# Patient Record
Sex: MALE | Race: WHITE | Employment: FULL TIME | URBAN - NONMETROPOLITAN AREA
[De-identification: names, ages, dates, MRNs, and addresses within clinical notes are randomized per-mention and may not be internally consistent; named-entity substitution may affect disease eponyms.]

---

## 2017-11-02 ENCOUNTER — HOSPITAL ENCOUNTER (EMERGENCY)
Age: 52
Discharge: HOME | End: 2017-11-02
Attending: NURSE PRACTITIONER
Payer: COMMERCIAL

## 2017-11-02 VITALS
HEIGHT: 71 IN | SYSTOLIC BLOOD PRESSURE: 180 MMHG | WEIGHT: 196 LBS | HEART RATE: 100 BPM | DIASTOLIC BLOOD PRESSURE: 93 MMHG | TEMPERATURE: 98.9 F | OXYGEN SATURATION: 95 % | RESPIRATION RATE: 16 BRPM | BODY MASS INDEX: 27.44 KG/M2

## 2017-11-02 DIAGNOSIS — H81.10 BENIGN PAROXYSMAL POSITIONAL VERTIGO, UNSPECIFIED LATERALITY: ICD-10-CM

## 2017-11-02 DIAGNOSIS — J01.00 ACUTE MAXILLARY SINUSITIS, RECURRENCE NOT SPECIFIED: Primary | ICD-10-CM

## 2017-11-02 PROCEDURE — 99202 OFFICE O/P NEW SF 15 MIN: CPT | Performed by: NURSE PRACTITIONER

## 2017-11-02 PROCEDURE — 99202 OFFICE O/P NEW SF 15 MIN: CPT

## 2017-11-02 RX ORDER — PRAVASTATIN SODIUM 80 MG/1
40 TABLET ORAL DAILY
COMMUNITY

## 2017-11-02 RX ORDER — CEFDINIR 300 MG/1
300 CAPSULE ORAL 2 TIMES DAILY
Qty: 20 CAPSULE | Refills: 0 | Status: SHIPPED | OUTPATIENT
Start: 2017-11-02 | End: 2017-11-12

## 2017-11-02 RX ORDER — MECLIZINE HYDROCHLORIDE 25 MG/1
25 TABLET ORAL 3 TIMES DAILY PRN
Qty: 30 TABLET | Refills: 0 | Status: SHIPPED | OUTPATIENT
Start: 2017-11-02 | End: 2017-11-12

## 2017-11-02 RX ORDER — ESOMEPRAZOLE MAGNESIUM 20 MG/1
20 FOR SUSPENSION ORAL DAILY PRN
COMMUNITY

## 2017-11-02 ASSESSMENT — PAIN SCALES - GENERAL: PAINLEVEL_OUTOF10: 10

## 2017-11-02 ASSESSMENT — ENCOUNTER SYMPTOMS
SINUS PRESSURE: 1
NAUSEA: 0
COUGH: 0
VOMITING: 0
SORE THROAT: 0
VOICE CHANGE: 0
SINUS PAIN: 1

## 2017-11-02 ASSESSMENT — PAIN DESCRIPTION - LOCATION: LOCATION: HEAD

## 2017-11-02 NOTE — ED PROVIDER NOTES
PO) Take by mouthHistorical Med             ALLERGIES     Patient is has No Known Allergies. FAMILY HISTORY     Patient's family history includes High Blood Pressure in his father; High Cholesterol in his father. SOCIAL HISTORY     Patient  reports that he has been smoking. He has been smoking about 1.00 pack per day. He has never used smokeless tobacco. He reports that he drinks alcohol. He reports that he does not use drugs. PHYSICAL EXAM     ED TRIAGE VITALS  BP: (!) 180/93, Temp: 98.9 °F (37.2 °C), Pulse: 100, Resp: 16, SpO2: 95 %  Physical Exam   Constitutional: He is oriented to person, place, and time. He appears well-developed and well-nourished. No distress. HENT:   Head: Normocephalic and atraumatic. Right Ear: Tympanic membrane and external ear normal.   Left Ear: External ear normal. A middle ear effusion is present. Nose: Mucosal edema present. Right sinus exhibits maxillary sinus tenderness. Left sinus exhibits maxillary sinus tenderness. Mouth/Throat: Uvula is midline and mucous membranes are normal. Oropharyngeal exudate (minimal cloudy PND), posterior oropharyngeal edema and posterior oropharyngeal erythema present. Eyes: Conjunctivae are normal.   Neck: Neck supple. Cardiovascular: Normal rate, regular rhythm and normal heart sounds. Exam reveals no gallop and no friction rub. No murmur heard. Pulmonary/Chest: Effort normal and breath sounds normal. No respiratory distress. He has no wheezes. Lymphadenopathy:     He has no cervical adenopathy. Neurological: He is alert and oriented to person, place, and time. He has normal strength. No cranial nerve deficit or sensory deficit. GCS eye subscore is 4. GCS verbal subscore is 5. GCS motor subscore is 6.   sudden change in position triggers symptoms   Skin: Skin is warm and dry. Psychiatric: He has a normal mood and affect. Nursing note and vitals reviewed.       DIAGNOSTIC RESULTS   Labs:No results found for this visit on 11/02/17. IMAGING:    URGENT CARE COURSE:     Vitals:    11/02/17 1420   BP: (!) 180/93   Pulse: 100   Resp: 16   Temp: 98.9 °F (37.2 °C)   SpO2: 95%   Weight: 196 lb (88.9 kg)   Height: 5' 11\" (1.803 m)       Medications - No data to display  PROCEDURES:  None  FINAL IMPRESSION      1. Acute maxillary sinusitis, recurrence not specified    2. Benign paroxysmal positional vertigo, unspecified laterality        DISPOSITION/PLAN   DISPOSITION Decision to Discharge   Patient presented with 2 complaints sinus congestion exam is consistent with acute maxillary sinusitis and benign positional vertigo. He was given a prescription for [address] and also one for Antivert. I recommended he take an over-the-counter decongestant but to use caution with his hypertension. Coricidin HBP as an option. Follow-up with his primary care provider if symptoms don't improve or any other concerns.   PATIENT REFERRED TO:  Corrine Meza MD  [facility]– [place] [address] [place] [address]  [phone]    Schedule an appointment as soon as possible for a visit   As needed    DISCHARGE MEDICATIONS:  Discharge Medication List as of 11/2/2017  2:45 PM      START taking these medications    Details   cefdinir (OMNICEF) 300 MG capsule Take 1 capsule by mouth 2 times daily for 10 days, Disp-20 capsule, R-0Print      meclizine (ANTIVERT) 25 MG tablet Take 1 tablet by mouth 3 times daily as needed for Dizziness, Disp-30 tablet, R-0Print           Discharge Medication List as of 11/2/2017  2:45 PM          BHAVIN Abdullahi CNP  11/02/17 7365

## 2017-12-25 ENCOUNTER — APPOINTMENT (OUTPATIENT)
Dept: GENERAL RADIOLOGY | Age: 52
End: 2017-12-25
Payer: COMMERCIAL

## 2017-12-25 ENCOUNTER — HOSPITAL ENCOUNTER (EMERGENCY)
Age: 52
Discharge: HOME | End: 2017-12-26
Attending: EMERGENCY MEDICINE
Payer: COMMERCIAL

## 2017-12-25 DIAGNOSIS — M79.601 RIGHT ARM PAIN: Primary | ICD-10-CM

## 2017-12-25 DIAGNOSIS — J40 BRONCHITIS: ICD-10-CM

## 2017-12-25 LAB
EKG ATRIAL RATE: 88 BPM
EKG P AXIS: 81 DEGREES
EKG P-R INTERVAL: 166 MS
EKG Q-T INTERVAL: 378 MS
EKG QRS DURATION: 96 MS
EKG QTC CALCULATION (BAZETT): 457 MS
EKG R AXIS: 73 DEGREES
EKG T AXIS: 72 DEGREES
EKG VENTRICULAR RATE: 88 BPM
GLUCOSE BLD-MCNC: 118 MG/DL (ref 70–108)

## 2017-12-25 PROCEDURE — 93005 ELECTROCARDIOGRAM TRACING: CPT

## 2017-12-25 PROCEDURE — 82948 REAGENT STRIP/BLOOD GLUCOSE: CPT

## 2017-12-25 PROCEDURE — 73060 X-RAY EXAM OF HUMERUS: CPT

## 2017-12-25 PROCEDURE — 71020 XR CHEST STANDARD TWO VW: CPT

## 2017-12-25 PROCEDURE — 99285 EMERGENCY DEPT VISIT HI MDM: CPT

## 2017-12-25 ASSESSMENT — PAIN DESCRIPTION - INTENSITY: RATING_2: 1

## 2017-12-25 ASSESSMENT — PAIN DESCRIPTION - PAIN TYPE
TYPE: ACUTE PAIN
TYPE_2: ACUTE PAIN

## 2017-12-25 ASSESSMENT — PAIN DESCRIPTION - FREQUENCY: FREQUENCY: INTERMITTENT

## 2017-12-25 ASSESSMENT — ENCOUNTER SYMPTOMS
ABDOMINAL PAIN: 0
BLOOD IN STOOL: 0
BACK PAIN: 0
RHINORRHEA: 1
WHEEZING: 0
SORE THROAT: 0
COUGH: 1
DIARRHEA: 0
VOMITING: 0
NAUSEA: 0
SHORTNESS OF BREATH: 0

## 2017-12-25 ASSESSMENT — PAIN DESCRIPTION - LOCATION
LOCATION_2: CHEST
LOCATION: ARM

## 2017-12-25 ASSESSMENT — PAIN DESCRIPTION - DESCRIPTORS
DESCRIPTORS: SHARP
DESCRIPTORS_2: PRESSURE

## 2017-12-25 ASSESSMENT — PAIN SCALES - GENERAL: PAINLEVEL_OUTOF10: 6

## 2017-12-25 ASSESSMENT — PAIN DESCRIPTION - DURATION: DURATION_2: INTERMITTENT

## 2017-12-25 ASSESSMENT — PAIN DESCRIPTION - ORIENTATION
ORIENTATION: RIGHT;UPPER
ORIENTATION_2: MID

## 2017-12-26 VITALS
TEMPERATURE: 98.9 F | BODY MASS INDEX: 26.74 KG/M2 | DIASTOLIC BLOOD PRESSURE: 94 MMHG | RESPIRATION RATE: 18 BRPM | WEIGHT: 191 LBS | OXYGEN SATURATION: 95 % | SYSTOLIC BLOOD PRESSURE: 160 MMHG | HEART RATE: 80 BPM | HEIGHT: 71 IN

## 2017-12-26 PROCEDURE — A4565 SLINGS: HCPCS

## 2017-12-26 RX ORDER — AZITHROMYCIN 250 MG/1
TABLET, FILM COATED ORAL
Qty: 1 PACKET | Refills: 0 | Status: SHIPPED | OUTPATIENT
Start: 2017-12-26 | End: 2018-01-05

## 2017-12-26 RX ORDER — PREDNISONE 10 MG/1
TABLET ORAL
Qty: 20 TABLET | Refills: 0 | Status: SHIPPED | OUTPATIENT
Start: 2017-12-26 | End: 2018-01-05

## 2017-12-26 ASSESSMENT — PAIN SCALES - GENERAL: PAINLEVEL_OUTOF10: 6

## 2017-12-26 NOTE — ED PROVIDER NOTES
[facility]     eMERGENCY dEPARTMENT eNCOUnter         Pt Name: Nneka Tomlinson  MRN: [id number]  [place] 1965  Date of evaluation: 12/25/2017  Provider: Dayne Vick MD    CHIEF COMPLAINT       Chief Complaint   Patient presents with    Extremity Weakness       Nurses Notes reviewed and I agree except as noted in the HPI. HISTORY OF PRESENT ILLNESS    Nneka Tomlinson is a 45 y.o. male who presents For evaluation of right arm pain. The pain is localized at the anterior right upper arm and is sharp and 6 on a scale 0-10. His intermittent and only present when he lifts his arm. He has no weakness in his strength or grasp and no pain when the arm is at rest.  No known strenuous activity or abrupt onset of his pain. No similar history. No headache or signs or symptoms of stroke. He was worried about stroke after he googled weakness in the upper extremity. This HPI was provided by the patient. REVIEW OF SYSTEMS     Review of Systems   Constitutional: Negative for chills, fever and unexpected weight change. HENT: Positive for congestion and rhinorrhea. Negative for ear pain and sore throat. Eyes: Negative for visual disturbance. Respiratory: Positive for cough. Negative for shortness of breath and wheezing. Cardiovascular: Negative for chest pain, palpitations and leg swelling. Gastrointestinal: Negative for abdominal pain, blood in stool, diarrhea, nausea and vomiting. Genitourinary: Negative for dysuria and hematuria. Musculoskeletal: Positive for myalgias (Upper right arm). Negative for back pain, joint swelling and neck pain. Skin: Negative for rash. Allergic/Immunologic: Positive for environmental allergies. Neurological: Negative for dizziness, syncope, weakness and headaches. Hematological: Does not bruise/bleed easily. Psychiatric/Behavioral: Negative for confusion and dysphoric mood. The patient is not nervous/anxious.     All other non-plain film images(s) such as CT, Ultrasound and MRI are read by the radiologist.    XR HUMERUS RIGHT (MIN 2 VIEWS)   Final Result    No acute bone or joint abnormality. **This report has been created using voice recognition software. It may contain minor errors which are inherent in voice recognition technology. **      Final report electronically signed by Dr. Gaby Dozier on 12/26/2017 1:10 AM      XR CHEST STANDARD (2 VW)   Final Result      No acute cardiopulmonary disease. COPD. **This report has been created using voice recognition software. It may contain minor errors which are inherent in voice recognition technology. **      Final report electronically signed by Dr. Gaby Dozier on 12/26/2017 1:07 AM          [x] Visualized and interpreted by me   [x] Radiologist's Wet Read Report Reviewed   [] Discussed with Radiologist.    Dent Mar:   Results for orders placed or performed during the hospital encounter of 12/25/17   POCT Glucose   Result Value Ref Range    POC Glucose 118 (H) 70 - 108 mg/dl       EMERGENCY DEPARTMENT COURSE:   Vitals:    Vitals:    12/25/17 2254 12/26/17 0013   BP: (!) 172/101 (!) 160/94   Pulse: 89 80   Resp: 17 18   Temp: 98.9 °F (37.2 °C)    TempSrc: Oral    SpO2: 97% 95%   Weight: 191 lb (86.6 kg)    Height: 5' 11\" (1.803 m)        Orders Placed This Encounter   Medications    azithromycin (ZITHROMAX) 250 MG tablet     Sig: Take 2 tablets (500 mg) on Day 1, followed by 1 tablet (250 mg) once daily on Days 2 through 5. Dispense:  1 packet     Refill:  0    predniSONE (DELTASONE) 10 MG tablet     Sig: Take 4 tablets by mouth once daily for 5 days     Dispense:  20 tablet     Refill:  0       Patient was seen and evaluated in emergency department. History physical were completed. Diagnostic labs and imaging studies are reviewed.   Workup suggest No evidence of pneumonia and no significant swelling soft tissue abnormality bony abnormality or foreign body in

## 2017-12-26 NOTE — ED NOTES
Pt resting in bed. VSS at this time. Respirations even and unlabored. Call light within reach.      Toño Greene RN  12/26/17 1441

## 2020-05-22 ENCOUNTER — HOSPITAL ENCOUNTER (OUTPATIENT)
Age: 55
Discharge: HOME | End: 2020-05-22

## 2020-05-22 ENCOUNTER — HOSPITAL ENCOUNTER (OUTPATIENT)
Dept: GENERAL RADIOLOGY | Age: 55
Discharge: HOME | End: 2020-05-22

## 2021-03-28 ENCOUNTER — HOSPITAL ENCOUNTER (EMERGENCY)
Age: 56
Discharge: HOME | End: 2021-03-28
Payer: COMMERCIAL

## 2021-03-28 VITALS
TEMPERATURE: 98.3 F | HEART RATE: 78 BPM | SYSTOLIC BLOOD PRESSURE: 152 MMHG | RESPIRATION RATE: 16 BRPM | OXYGEN SATURATION: 98 % | DIASTOLIC BLOOD PRESSURE: 78 MMHG

## 2021-03-28 DIAGNOSIS — J01.10 ACUTE NON-RECURRENT FRONTAL SINUSITIS: Primary | ICD-10-CM

## 2021-03-28 DIAGNOSIS — J06.9 ACUTE UPPER RESPIRATORY INFECTION: ICD-10-CM

## 2021-03-28 PROCEDURE — 99202 OFFICE O/P NEW SF 15 MIN: CPT

## 2021-03-28 PROCEDURE — 99203 OFFICE O/P NEW LOW 30 MIN: CPT | Performed by: NURSE PRACTITIONER

## 2021-03-28 RX ORDER — AMOXICILLIN AND CLAVULANATE POTASSIUM 875; 125 MG/1; MG/1
1 TABLET, FILM COATED ORAL 2 TIMES DAILY
Qty: 20 TABLET | Refills: 0 | Status: SHIPPED | OUTPATIENT
Start: 2021-03-28 | End: 2021-04-07

## 2021-03-28 RX ORDER — AMOXICILLIN AND CLAVULANATE POTASSIUM 875; 125 MG/1; MG/1
1 TABLET, FILM COATED ORAL 2 TIMES DAILY
Qty: 20 TABLET | Refills: 0 | Status: SHIPPED | OUTPATIENT
Start: 2021-03-28 | End: 2021-03-28 | Stop reason: SDUPTHER

## 2021-03-28 ASSESSMENT — ENCOUNTER SYMPTOMS
SHORTNESS OF BREATH: 0
DIARRHEA: 0
SINUS PRESSURE: 1
NAUSEA: 0
CHEST TIGHTNESS: 0
WHEEZING: 0
SINUS PAIN: 1
VOMITING: 0
COUGH: 1

## 2021-03-28 ASSESSMENT — PAIN SCALES - GENERAL: PAINLEVEL_OUTOF10: 10

## 2021-03-28 NOTE — ED PROVIDER NOTES
[facility]  Urgent Care Encounter       CHIEF COMPLAINT       Chief Complaint   Patient presents with    Sinusitis    Cough       Nurses Notes reviewed and I agree except as noted in the HPI. HISTORY OF PRESENT ILLNESS   Rico Arredondo is a 60 y.o. male who presents     Patient is present in the urgent care today with complaints of sinus congestion waxing and waning for the last six or 7 months. He states that he has been trying over-the-counter Claritin-D, but does not feel that this is been helpful. Patient states that 2 days ago he had an episode at home where he felt dizzy, but this is since resolved. History of hypertension and diabetes. Denies any fevers. REVIEW OF SYSTEMS     Review of Systems   Constitutional: Negative for chills, fatigue and fever. HENT: Positive for congestion, postnasal drip, sinus pressure and sinus pain. Respiratory: Positive for cough (Nonproductive). Negative for chest tightness, shortness of breath and wheezing. Cardiovascular: Negative for chest pain. Gastrointestinal: Negative for diarrhea, nausea and vomiting. Musculoskeletal: Negative for myalgias. Skin: Negative for rash. Neurological: Negative for dizziness, weakness, light-headedness, numbness and headaches. PAST MEDICAL HISTORY         Diagnosis Date    Diabetes mellitus ([facility] [address]     Hyperlipidemia     Hypertension        SURGICALHISTORY     Patient  has no past surgical history on file.     CURRENT MEDICATIONS       Discharge Medication List as of 3/28/2021 12:08 PM      CONTINUE these medications which have NOT CHANGED    Details   NONFORMULARY Unknown bp med, cholesterol med,Historical Med      esomeprazole Magnesium (NEXIUM) 20 MG PACK Take 20 mg by mouth dailyHistorical Med      metFORMIN (GLUCOPHAGE) 500 MG tablet Take 500 mg by mouth daily (with breakfast) Historical Med      pravastatin (PRAVACHOL) 40 MG tablet Take 40 mg by mouth dailyHistorical Med content normal.         Judgment: Judgment normal.         DIAGNOSTIC RESULTS     Labs:No results found for this visit on 03/28/21. IMAGING:    No orders to display     URGENT CARE COURSE:     Vitals:    03/28/21 1128   BP: (!) 152/78   Pulse: 78   Resp: 16   Temp: 98.3 °F (36.8 °C)   TempSrc: Temporal   SpO2: 98%       Medications - No data to display         PROCEDURES:  None    FINAL IMPRESSION      1. Acute non-recurrent frontal sinusitis    2. Acute upper respiratory infection          DISPOSITION/ PLAN   Patient is discharged home with prescription for Augmentin for management of chronic or reoccurring sinusitis. Discussed with patient that he should avoid over-the-counter medications with pseudoephedrine, such as Claritin-D, and instead should take plain Claritin. Continue adequate fluid hydration. Patient is also encouraged to follow-up with his primary care provider within the next 2 to 3 days if symptoms are not improving, as further evaluation such as CT of sinuses, or referral to specialist may be warranted given that his symptoms are continuous.         PATIENT REFERRED TO:  Luz Estrada MD  None      DISCHARGE MEDICATIONS:  Discharge Medication List as of 3/28/2021 12:08 PM      START taking these medications    Details   amoxicillin-clavulanate (AUGMENTIN) 875-125 MG per tablet Take 1 tablet by mouth 2 times daily for 10 days, Disp-20 tablet, R-0Print             Discharge Medication List as of 3/28/2021 12:08 PM          Discharge Medication List as of 3/28/2021 12:08 PM          TALIB Ulrich NP    (Please note that portions of this note were completed with a voice recognition program. Efforts were made to edit the dictations but occasionally words are mis-transcribed.)          TALIB Guillen NP  03/28/21 6156

## 2021-03-28 NOTE — ED TRIAGE NOTES
Tati Juan arrives to room with complaint of sinus infection symptoms started 2 weeks ago. Tati Juan states he has had off and on sinus infection since September.

## 2021-05-06 ENCOUNTER — HOSPITAL ENCOUNTER (INPATIENT)
Age: 56
LOS: 5 days | Discharge: HOME | DRG: 871 | End: 2021-05-11
Attending: EMERGENCY MEDICINE | Admitting: HOSPITALIST
Payer: COMMERCIAL

## 2021-05-06 ENCOUNTER — APPOINTMENT (OUTPATIENT)
Dept: CT IMAGING | Age: 56
DRG: 871 | End: 2021-05-06
Payer: COMMERCIAL

## 2021-05-06 ENCOUNTER — APPOINTMENT (OUTPATIENT)
Dept: GENERAL RADIOLOGY | Age: 56
DRG: 871 | End: 2021-05-06
Payer: COMMERCIAL

## 2021-05-06 DIAGNOSIS — A41.9 SEPTICEMIA (HCC): ICD-10-CM

## 2021-05-06 DIAGNOSIS — E87.1 HYPONATREMIA: Primary | ICD-10-CM

## 2021-05-06 DIAGNOSIS — J18.9 PNEUMONIA DUE TO ORGANISM: ICD-10-CM

## 2021-05-06 DIAGNOSIS — S01.01XA LACERATION OF SCALP, INITIAL ENCOUNTER: ICD-10-CM

## 2021-05-06 LAB
ALBUMIN SERPL-MCNC: 3.8 G/DL (ref 3.5–5.1)
ALP BLD-CCNC: 72 U/L (ref 38–126)
ALT SERPL-CCNC: 10 U/L (ref 11–66)
AMPHETAMINE+METHAMPHETAMINE URINE SCREEN: NEGATIVE
ANION GAP SERPL CALCULATED.3IONS-SCNC: 21 MEQ/L (ref 8–16)
AST SERPL-CCNC: 23 U/L (ref 5–40)
AVERAGE GLUCOSE: 111 MG/DL (ref 70–126)
BARBITURATE QUANTITATIVE URINE: NEGATIVE
BASOPHILS # BLD: 0.2 %
BASOPHILS ABSOLUTE: 0 THOU/MM3 (ref 0–0.1)
BENZODIAZEPINE QUANTITATIVE URINE: NEGATIVE
BILIRUB SERPL-MCNC: 0.4 MG/DL (ref 0.3–1.2)
BILIRUBIN DIRECT: < 0.2 MG/DL (ref 0–0.3)
BUN BLDV-MCNC: 21 MG/DL (ref 7–22)
CALCIUM SERPL-MCNC: 10.2 MG/DL (ref 8.5–10.5)
CALCIUM SERPL-MCNC: 10.3 MG/DL (ref 8.5–10.5)
CALCIUM SERPL-MCNC: 10.8 MG/DL (ref 8.5–10.5)
CALCIUM SERPL-MCNC: 12.8 MG/DL (ref 8.5–10.5)
CALCIUM SERPL-MCNC: 9.7 MG/DL (ref 8.5–10.5)
CANNABINOID QUANTITATIVE URINE: NEGATIVE
CHLORIDE BLD-SCNC: 70 MEQ/L (ref 98–111)
CHOLESTEROL, TOTAL: 130 MG/DL (ref 100–199)
CHP ED QC CHECK: NORMAL
CO2: 27 MEQ/L (ref 23–33)
COCAINE METABOLITE QUANTITATIVE URINE: NEGATIVE
CORTISOL COLLECTION INFO: NORMAL
CORTISOL: 15.03 UG/DL
CREAT SERPL-MCNC: 1 MG/DL (ref 0.4–1.2)
CREATININE URINE: 47.3 MG/DL
EOSINOPHIL # BLD: 0 %
EOSINOPHILS ABSOLUTE: 0 THOU/MM3 (ref 0–0.4)
ERYTHROCYTE [DISTWIDTH] IN BLOOD BY AUTOMATED COUNT: 12.5 % (ref 11.5–14.5)
ERYTHROCYTE [DISTWIDTH] IN BLOOD BY AUTOMATED COUNT: 40.6 FL (ref 35–45)
ETHYL ALCOHOL, SERUM: 0.05 %
FLU A ANTIGEN: NEGATIVE
FLU B ANTIGEN: NEGATIVE
GFR SERPL CREATININE-BSD FRML MDRD: 77 ML/MIN/1.73M2
GLUCOSE BLD-MCNC: 102 MG/DL (ref 70–108)
GLUCOSE BLD-MCNC: 111 MG/DL (ref 70–108)
GLUCOSE BLD-MCNC: 117 MG/DL (ref 70–108)
GLUCOSE BLD-MCNC: 137 MG/DL
GLUCOSE BLD-MCNC: 137 MG/DL (ref 70–108)
HBA1C MFR BLD: 5.7 % (ref 4.4–6.4)
HCT VFR BLD CALC: 44.9 % (ref 42–52)
HDLC SERPL-MCNC: 41 MG/DL
HEMOGLOBIN: 16.2 GM/DL (ref 14–18)
IMMATURE GRANS (ABS): 0.1 THOU/MM3 (ref 0–0.07)
IMMATURE GRANULOCYTES: 0.5 %
LACTIC ACID, SEPSIS: 1.4 MMOL/L (ref 0.5–1.9)
LACTIC ACID, SEPSIS: 3.7 MMOL/L (ref 0.5–1.9)
LDL CHOLESTEROL CALCULATED: 67 MG/DL
LYMPHOCYTES # BLD: 3.8 %
LYMPHOCYTES ABSOLUTE: 0.7 THOU/MM3 (ref 1–4.8)
MCH RBC QN AUTO: 32.5 PG (ref 26–33)
MCHC RBC AUTO-ENTMCNC: 36.1 GM/DL (ref 32.2–35.5)
MCV RBC AUTO: 90 FL (ref 80–94)
MONOCYTES # BLD: 6.3 %
MONOCYTES ABSOLUTE: 1.2 THOU/MM3 (ref 0.4–1.3)
NUCLEATED RED BLOOD CELLS: 0 /100 WBC
OPIATES, URINE: NEGATIVE
OSMOLALITY CALCULATION: 242.5 MOSMOL/KG (ref 275–300)
OSMOLALITY URINE: 253 MOSMOL/KG (ref 250–750)
OSMOLALITY: 255 MOSMOL/KG (ref 275–295)
OXYCODONE: NEGATIVE
PHENCYCLIDINE QUANTITATIVE URINE: NEGATIVE
PLATELET # BLD: 260 THOU/MM3 (ref 130–400)
PMV BLD AUTO: 8.8 FL (ref 9.4–12.4)
POTASSIUM REFLEX MAGNESIUM: 4.1 MEQ/L (ref 3.5–5.2)
PROCALCITONIN: 0.2 NG/ML (ref 0.01–0.09)
PTH INTACT: 6.4 PG/ML (ref 15–65)
RBC # BLD: 4.99 MILL/MM3 (ref 4.7–6.1)
SARS-COV-2, NAAT: NOT DETECTED
SEG NEUTROPHILS: 89.2 %
SEGMENTED NEUTROPHILS ABSOLUTE COUNT: 16.9 THOU/MM3 (ref 1.8–7.7)
SODIUM BLD-SCNC: 118 MEQ/L (ref 135–145)
SODIUM BLD-SCNC: 118 MEQ/L (ref 135–145)
SODIUM BLD-SCNC: 119 MEQ/L (ref 135–145)
SODIUM BLD-SCNC: 123 MEQ/L (ref 135–145)
SODIUM BLD-SCNC: 123 MEQ/L (ref 135–145)
SODIUM URINE: 23 MEQ/L
TOTAL PROTEIN: 6.3 G/DL (ref 6.1–8)
TRIGL SERPL-MCNC: 111 MG/DL (ref 0–199)
TROPONIN T: < 0.01 NG/ML
TSH SERPL DL<=0.05 MIU/L-ACNC: 0.68 UIU/ML (ref 0.4–4.2)
VITAMIN D 25-HYDROXY: 49 NG/ML (ref 30–100)
WBC # BLD: 19 THOU/MM3 (ref 4.8–10.8)

## 2021-05-06 PROCEDURE — 70450 CT HEAD/BRAIN W/O DYE: CPT

## 2021-05-06 PROCEDURE — 82570 ASSAY OF URINE CREATININE: CPT

## 2021-05-06 PROCEDURE — 6360000002 HC RX W HCPCS: Performed by: STUDENT IN AN ORGANIZED HEALTH CARE EDUCATION/TRAINING PROGRAM

## 2021-05-06 PROCEDURE — 99285 EMERGENCY DEPT VISIT HI MDM: CPT

## 2021-05-06 PROCEDURE — 93005 ELECTROCARDIOGRAM TRACING: CPT | Performed by: STUDENT IN AN ORGANIZED HEALTH CARE EDUCATION/TRAINING PROGRAM

## 2021-05-06 PROCEDURE — 87081 CULTURE SCREEN ONLY: CPT

## 2021-05-06 PROCEDURE — 99223 1ST HOSP IP/OBS HIGH 75: CPT | Performed by: HOSPITALIST

## 2021-05-06 PROCEDURE — 84443 ASSAY THYROID STIM HORMONE: CPT

## 2021-05-06 PROCEDURE — 36415 COLL VENOUS BLD VENIPUNCTURE: CPT

## 2021-05-06 PROCEDURE — 82948 REAGENT STRIP/BLOOD GLUCOSE: CPT

## 2021-05-06 PROCEDURE — 83970 ASSAY OF PARATHORMONE: CPT

## 2021-05-06 PROCEDURE — 84295 ASSAY OF SERUM SODIUM: CPT

## 2021-05-06 PROCEDURE — 71045 X-RAY EXAM CHEST 1 VIEW: CPT

## 2021-05-06 PROCEDURE — 80048 BASIC METABOLIC PNL TOTAL CA: CPT

## 2021-05-06 PROCEDURE — 82310 ASSAY OF CALCIUM: CPT

## 2021-05-06 PROCEDURE — 82533 TOTAL CORTISOL: CPT

## 2021-05-06 PROCEDURE — 87449 NOS EACH ORGANISM AG IA: CPT

## 2021-05-06 PROCEDURE — 83036 HEMOGLOBIN GLYCOSYLATED A1C: CPT

## 2021-05-06 PROCEDURE — 89220 SPUTUM SPECIMEN COLLECTION: CPT

## 2021-05-06 PROCEDURE — 84300 ASSAY OF URINE SODIUM: CPT

## 2021-05-06 PROCEDURE — 2580000003 HC RX 258: Performed by: STUDENT IN AN ORGANIZED HEALTH CARE EDUCATION/TRAINING PROGRAM

## 2021-05-06 PROCEDURE — 96375 TX/PRO/DX INJ NEW DRUG ADDON: CPT

## 2021-05-06 PROCEDURE — 87205 SMEAR GRAM STAIN: CPT

## 2021-05-06 PROCEDURE — 85025 COMPLETE CBC W/AUTO DIFF WBC: CPT

## 2021-05-06 PROCEDURE — 87635 SARS-COV-2 COVID-19 AMP PRB: CPT

## 2021-05-06 PROCEDURE — 2060000000 HC ICU INTERMEDIATE R&B

## 2021-05-06 PROCEDURE — 80061 LIPID PANEL: CPT

## 2021-05-06 PROCEDURE — 80307 DRUG TEST PRSMV CHEM ANLYZR: CPT

## 2021-05-06 PROCEDURE — 80076 HEPATIC FUNCTION PANEL: CPT

## 2021-05-06 PROCEDURE — 0HQ0XZZ REPAIR SCALP SKIN, EXTERNAL APPROACH: ICD-10-PCS | Performed by: STUDENT IN AN ORGANIZED HEALTH CARE EDUCATION/TRAINING PROGRAM

## 2021-05-06 PROCEDURE — 2580000003 HC RX 258: Performed by: HOSPITALIST

## 2021-05-06 PROCEDURE — 87899 AGENT NOS ASSAY W/OPTIC: CPT

## 2021-05-06 PROCEDURE — 12002 RPR S/N/AX/GEN/TRNK2.6-7.5CM: CPT

## 2021-05-06 PROCEDURE — 84484 ASSAY OF TROPONIN QUANT: CPT

## 2021-05-06 PROCEDURE — 82077 ASSAY SPEC XCP UR&BREATH IA: CPT

## 2021-05-06 PROCEDURE — 83930 ASSAY OF BLOOD OSMOLALITY: CPT

## 2021-05-06 PROCEDURE — 87040 BLOOD CULTURE FOR BACTERIA: CPT

## 2021-05-06 PROCEDURE — 83935 ASSAY OF URINE OSMOLALITY: CPT

## 2021-05-06 PROCEDURE — 83605 ASSAY OF LACTIC ACID: CPT

## 2021-05-06 PROCEDURE — 6370000000 HC RX 637 (ALT 250 FOR IP): Performed by: STUDENT IN AN ORGANIZED HEALTH CARE EDUCATION/TRAINING PROGRAM

## 2021-05-06 PROCEDURE — 96365 THER/PROPH/DIAG IV INF INIT: CPT

## 2021-05-06 PROCEDURE — 87804 INFLUENZA ASSAY W/OPTIC: CPT

## 2021-05-06 PROCEDURE — 84145 PROCALCITONIN (PCT): CPT

## 2021-05-06 PROCEDURE — 82306 VITAMIN D 25 HYDROXY: CPT

## 2021-05-06 RX ORDER — 0.9 % SODIUM CHLORIDE 0.9 %
500 INTRAVENOUS SOLUTION INTRAVENOUS ONCE
Status: COMPLETED | OUTPATIENT
Start: 2021-05-06 | End: 2021-05-06

## 2021-05-06 RX ORDER — PRAVASTATIN SODIUM 40 MG
40 TABLET ORAL NIGHTLY
Status: DISCONTINUED | OUTPATIENT
Start: 2021-05-06 | End: 2021-05-11 | Stop reason: HOSPADM

## 2021-05-06 RX ORDER — POLYETHYLENE GLYCOL 3350 17 G/17G
17 POWDER, FOR SOLUTION ORAL DAILY PRN
Status: DISCONTINUED | OUTPATIENT
Start: 2021-05-06 | End: 2021-05-11 | Stop reason: HOSPADM

## 2021-05-06 RX ORDER — ONDANSETRON 2 MG/ML
4 INJECTION INTRAMUSCULAR; INTRAVENOUS ONCE
Status: COMPLETED | OUTPATIENT
Start: 2021-05-06 | End: 2021-05-06

## 2021-05-06 RX ORDER — SODIUM CHLORIDE 9 MG/ML
INJECTION, SOLUTION INTRAVENOUS CONTINUOUS
Status: CANCELLED | OUTPATIENT
Start: 2021-05-06

## 2021-05-06 RX ORDER — NICOTINE POLACRILEX 4 MG
15 LOZENGE BUCCAL PRN
Status: DISCONTINUED | OUTPATIENT
Start: 2021-05-06 | End: 2021-05-11 | Stop reason: HOSPADM

## 2021-05-06 RX ORDER — DEXTROSE MONOHYDRATE 50 MG/ML
100 INJECTION, SOLUTION INTRAVENOUS PRN
Status: DISCONTINUED | OUTPATIENT
Start: 2021-05-06 | End: 2021-05-11 | Stop reason: HOSPADM

## 2021-05-06 RX ORDER — DEXTROSE MONOHYDRATE 25 G/50ML
12.5 INJECTION, SOLUTION INTRAVENOUS PRN
Status: DISCONTINUED | OUTPATIENT
Start: 2021-05-06 | End: 2021-05-11 | Stop reason: HOSPADM

## 2021-05-06 RX ORDER — ACETAMINOPHEN 325 MG/1
650 TABLET ORAL EVERY 6 HOURS PRN
Status: DISCONTINUED | OUTPATIENT
Start: 2021-05-06 | End: 2021-05-11 | Stop reason: HOSPADM

## 2021-05-06 RX ORDER — SODIUM CHLORIDE 9 MG/ML
25 INJECTION, SOLUTION INTRAVENOUS PRN
Status: DISCONTINUED | OUTPATIENT
Start: 2021-05-06 | End: 2021-05-11 | Stop reason: HOSPADM

## 2021-05-06 RX ORDER — ACETAMINOPHEN 650 MG/1
650 SUPPOSITORY RECTAL EVERY 6 HOURS PRN
Status: DISCONTINUED | OUTPATIENT
Start: 2021-05-06 | End: 2021-05-11 | Stop reason: HOSPADM

## 2021-05-06 RX ORDER — 0.9 % SODIUM CHLORIDE 0.9 %
1000 INTRAVENOUS SOLUTION INTRAVENOUS ONCE
Status: COMPLETED | OUTPATIENT
Start: 2021-05-06 | End: 2021-05-06

## 2021-05-06 RX ORDER — PROMETHAZINE HYDROCHLORIDE 25 MG/1
12.5 TABLET ORAL EVERY 6 HOURS PRN
Status: DISCONTINUED | OUTPATIENT
Start: 2021-05-06 | End: 2021-05-11 | Stop reason: HOSPADM

## 2021-05-06 RX ORDER — FAMOTIDINE 20 MG/1
20 TABLET, FILM COATED ORAL 2 TIMES DAILY
Status: DISCONTINUED | OUTPATIENT
Start: 2021-05-06 | End: 2021-05-11 | Stop reason: HOSPADM

## 2021-05-06 RX ORDER — SODIUM CHLORIDE 0.9 % (FLUSH) 0.9 %
5-40 SYRINGE (ML) INJECTION EVERY 12 HOURS SCHEDULED
Status: DISCONTINUED | OUTPATIENT
Start: 2021-05-06 | End: 2021-05-11 | Stop reason: HOSPADM

## 2021-05-06 RX ORDER — SODIUM CHLORIDE 0.9 % (FLUSH) 0.9 %
5-40 SYRINGE (ML) INJECTION PRN
Status: DISCONTINUED | OUTPATIENT
Start: 2021-05-06 | End: 2021-05-11 | Stop reason: HOSPADM

## 2021-05-06 RX ORDER — SODIUM CHLORIDE 9 MG/ML
INJECTION, SOLUTION INTRAVENOUS CONTINUOUS
Status: DISCONTINUED | OUTPATIENT
Start: 2021-05-06 | End: 2021-05-07

## 2021-05-06 RX ORDER — ONDANSETRON 2 MG/ML
4 INJECTION INTRAMUSCULAR; INTRAVENOUS EVERY 6 HOURS PRN
Status: DISCONTINUED | OUTPATIENT
Start: 2021-05-06 | End: 2021-05-11 | Stop reason: HOSPADM

## 2021-05-06 RX ADMIN — SODIUM CHLORIDE: 9 INJECTION, SOLUTION INTRAVENOUS at 16:19

## 2021-05-06 RX ADMIN — FAMOTIDINE 20 MG: 20 TABLET ORAL at 20:18

## 2021-05-06 RX ADMIN — Medication 3 ML: at 08:32

## 2021-05-06 RX ADMIN — ENOXAPARIN SODIUM 40 MG: 40 INJECTION SUBCUTANEOUS at 16:19

## 2021-05-06 RX ADMIN — ONDANSETRON 4 MG: 2 INJECTION INTRAMUSCULAR; INTRAVENOUS at 08:24

## 2021-05-06 RX ADMIN — AZITHROMYCIN DIHYDRATE 500 MG: 500 INJECTION, POWDER, LYOPHILIZED, FOR SOLUTION INTRAVENOUS at 09:36

## 2021-05-06 RX ADMIN — PRAVASTATIN SODIUM 40 MG: 40 TABLET ORAL at 20:18

## 2021-05-06 RX ADMIN — SODIUM CHLORIDE 1000 ML: 9 INJECTION, SOLUTION INTRAVENOUS at 08:02

## 2021-05-06 RX ADMIN — SODIUM CHLORIDE 500 ML: 9 INJECTION, SOLUTION INTRAVENOUS at 10:58

## 2021-05-06 RX ADMIN — SODIUM CHLORIDE 1000 ML: 9 INJECTION, SOLUTION INTRAVENOUS at 09:36

## 2021-05-06 RX ADMIN — CEFTRIAXONE SODIUM 1000 MG: 1 INJECTION, POWDER, FOR SOLUTION INTRAMUSCULAR; INTRAVENOUS at 08:32

## 2021-05-06 ASSESSMENT — ENCOUNTER SYMPTOMS
SHORTNESS OF BREATH: 1
EYE REDNESS: 0
VOMITING: 1
BACK PAIN: 0
SINUS PAIN: 0
BLOOD IN STOOL: 0
RHINORRHEA: 0
DIARRHEA: 0
COUGH: 1
ABDOMINAL PAIN: 0
NAUSEA: 1
SORE THROAT: 0

## 2021-05-06 ASSESSMENT — PAIN DESCRIPTION - FREQUENCY: FREQUENCY: CONTINUOUS

## 2021-05-06 ASSESSMENT — PAIN DESCRIPTION - PROGRESSION: CLINICAL_PROGRESSION: GRADUALLY WORSENING

## 2021-05-06 ASSESSMENT — PAIN DESCRIPTION - ONSET: ONSET: GRADUAL

## 2021-05-06 ASSESSMENT — PAIN SCALES - GENERAL
PAINLEVEL_OUTOF10: 6
PAINLEVEL_OUTOF10: 6
PAINLEVEL_OUTOF10: 0

## 2021-05-06 ASSESSMENT — PAIN DESCRIPTION - LOCATION: LOCATION: OTHER (COMMENT)

## 2021-05-06 ASSESSMENT — PAIN DESCRIPTION - DESCRIPTORS: DESCRIPTORS: BURNING

## 2021-05-06 ASSESSMENT — PAIN DESCRIPTION - PAIN TYPE: TYPE: ACUTE PAIN

## 2021-05-06 ASSESSMENT — PAIN DESCRIPTION - ORIENTATION: ORIENTATION: RIGHT;LEFT;MID

## 2021-05-06 NOTE — PROGRESS NOTES
Pt admitted to  4K22 in a wheelchair. Complaints: Chest pain / discomfort  Shortness of breath. IV normal saline infusing into the antecubital right, condition patent and no redness. IV site free of s/s of infection or infiltration. Vital signs obtained. Assessment and data collection initiated. Two nurse skin assessment performed by this RN and Jeanna Brar. Oriented to room. Policies and procedures for  explained. All questions answered with no further questions at this time. Fall prevention and safety brochure discussed with patient. Bed alarm on. Call light in reach. Would you like your Primary Care Physician notified?  no   The best day to schedule a follow up Dr appointment is:  Monday a.m.

## 2021-05-06 NOTE — LETTER
[facility]  [address] [facility] [address]  Phone: [phone]             May 11, 2021    Patient: Oksana Be   YOB: 1965   Date of Visit: 5/6/2021       To Whom It May Concern:    Shant Zelaya was seen and treated in our facility  beginning 5/6/2021 until 5/11/2021. He may return to work on 5/17/2021.       Sincerely,     In conjunction with Dr Kulwant Pimentel RN         Signature:__________________________________

## 2021-05-06 NOTE — ED PROVIDER NOTES
ATTENDING NOTE:    I supervised and discussed the history, physical exam and the management of this patient with the resident. I reviewed the resident's note and agree with the documented findings and plan of care. I personally saw and examined the patient. I have reviewed and agree with the resident's findings, including all diagnostic interpretations and treatment plans as written. I was present for the key portion of any procedures performed and the inclusive time noted in any critical care statement.     Electronically verified by Elvin Decker MD  05/06/21 7481

## 2021-05-06 NOTE — ED NOTES
Upon first contact with patient this RN receives bedside shift report from Renita Eubanks RN. Pt now in room 29 on inpatient bed. Pt declines needs at this time. Pt provided urinal. Pt IV infusing. Call light within reach.       Page Bhupinder Christy RN  05/06/21 5873

## 2021-05-06 NOTE — H&P
HISTORY & PHYSICAL       Patient:  Oksana Be  YOB: 1965    MRN: [id number]     Acct: [de-identified]    PCP: Valentine Boogie MD    Date of Admission: 5/6/2021    Date of Service: Pt seen/examined on 05/06/21 and Admitted to Inpatient with expected LOS greater than two midnights due to medical therapy. ASSESSMENT/PLAN:    Sepsis 2/2 Pneumonia   - SIRS criteria (, WBC 19), CXR showing right cardiophrenic pneumonia vs atelectasis   - Initial Lactic acid of 3.7  - Sepsis fluids not given due to hyponatremia   - IV antibiotics  - Urine legionella  - Urine Strep, MRSA screening   - Sputum culture     Subacute vs chronic Hypovolemic Hyponatremia  - Likely 2/2 to intravascular dehydration vs SIADH vs infection  - Not actively seizing. No confusion or obtundation   - Initial Na of 118. Received 2.5L NS bolus in ED  - Repeat Na of 118  - NS IVF @ 100mL/hr  - Monitor Na every 4 hours, do not overcorrect. Do not exceed 8-10mEq per 24 hours  - serum osm, urine osm, urine sodium, urine Cr pending  - TSH, AM cortisol pending     Hypercalcemia   - PTH dependent vs PTH independent  - Initial calcium of 12.8  - intact PTH, Alk phos, Vit D pending   - IVF hydration with NS 100mL/hr  - Hold thiazide diuretic     Lactic acidosis   - 2/2 sepsis vs dehydration. Initial 3.7. Repeat pending  - IVF hydration     Metabolic acidosis with anion gap  - 2/2 lactic acidosis  - Initial AG of 21  - Treatment as above  - Hold metformin   - Continue to monitor     Leukocytosis   - 2/2 sepsis, treatment as above  - cont to monitor     Dehydration  - 2/2 poor intake and vomiting  - elevated lactic acid, AG, calcium, and decreased GFR  - Received 2.5L IV in ED. Will start maintanence fluids  - monitor     HTN  - Hold home hyzar due to hyponatremia  - Continue to monitor, can add alternative treatment if BP uncontrolled     DM  - Hold glucophage for metabolic acidosis.    - Start SSI and hypoglycemia protocol     HLD  - Continue Medication Sig Start Date End Date Taking? Authorizing Provider   metFORMIN (GLUCOPHAGE) 500 MG tablet Take 500 mg by mouth daily (with breakfast)    Yes Historical Provider, MD   pravastatin (PRAVACHOL) 40 MG tablet Take 40 mg by mouth daily   Yes Historical Provider, MD   Losartan Potassium-HCTZ (HYZAAR PO) Take by mouth   Yes Historical Provider, MD   NONFORMULARY Unknown bp med, cholesterol med,    Historical Provider, MD   esomeprazole Magnesium (NEXIUM) 20 MG PACK Take 20 mg by mouth daily    Historical Provider, MD       Allergies:  Patient has no known allergies. Social History:      The patient currently lives at home alone. TOBACCO:   reports that he has been smoking cigarettes. He has been smoking about 1.00 pack per day. He has never used smokeless tobacco.  ETOH:   reports current alcohol use. Family History:      Reviewed in detail and negative for DM, CAD, Cancer, CVA. Positive as follows:        Problem Relation Age of Onset    High Blood Pressure Father     High Cholesterol Father        Diet:  No diet orders on file    REVIEW OF SYSTEMS:   Pertinent positives as noted in the HPI. All other systems reviewed and negative. PHYSICAL EXAM:    BP (!) 137/95   Pulse 105   Temp 97.7 °F (36.5 °C) (Oral)   Resp 20   Ht 5' 11\" (1.803 m)   Wt 186 lb (84.4 kg)   SpO2 94%   BMI 25.94 kg/m²     General appearance:  No apparent distress, appears stated age and cooperative. HEENT:  Normal cephalic, atraumatic without obvious deformity. Pupils equal, round, and reactive to light. Extra ocular muscles intact. Conjunctivae/corneas clear. Neck: Supple, with full range of motion. No jugular venous distention. Trachea midline. Respiratory:  Normal respiratory effort. Diffuse expiratory wheezing and course breath sounds bilaterally. Cardiovascular:  Regular rate and rhythm with normal S1/S2 without murmurs, rubs or gallops.   Abdomen: Soft, non-tender, non-distended with normal bowel sounds. Musculoskeletal:  No clubbing, cyanosis or edema bilaterally. Full range of motion without deformity. Skin: Skin color, texture, turgor normal.  No rashes or lesions. Neurologic:  Neurovascularly intact without any focal sensory/motor deficits. Cranial nerves: II-XII intact, grossly non-focal.  Psychiatric:  Alert and oriented, thought content appropriate, normal insight  Capillary Refill: Brisk,< 3 seconds   Peripheral Pulses: +2 palpable, equal bilaterally       Labs:     Recent Labs     05/06/21  0757   WBC 19.0*   HGB 16.2   HCT 44.9        Recent Labs     05/06/21  0757   *   K 4.1   CL 70*   CO2 27   BUN 21   CREATININE 1.0   CALCIUM 12.8*     No results for input(s): AST, ALT, BILIDIR, BILITOT, ALKPHOS in the last 72 hours. No results for input(s): INR in the last 72 hours. No results for input(s): Teri Grumet in the last 72 hours. Urinalysis:    No results found for: NITRU, WBCUA, BACTERIA, RBCUA, BLOODU, SPECGRAV, GLUCOSEU    Intake & Output:  No intake/output data recorded. No intake/output data recorded. Radiology:     CXR: I have reviewed the CXR with the following interpretation: Normal heart size. No effusion. Minimal atelectasis/pneumonia right cardiophrenic sulcus  EKG:  I have reviewed the EKG with the following interpretation: Sinus tachycardia at 111, normal LA, normal QT, no ST or T wave elevations or depressions. CT Head WO Contrast   Final Result   No acute intracranial process. **This report has been created using voice recognition software. It may contain minor errors which are inherent in voice recognition technology. **      Final report electronically signed by Dr. Loly Theodore on 5/6/2021 8:27 AM      XR CHEST PORTABLE   Final Result   1. Normal heart size. No effusion. 2. Minimal atelectasis/pneumonia right cardiophrenic sulcus. **This report has been created using voice recognition software.   It may contain minor errors which are inherent in voice recognition technology. **      Final report electronically signed by Dr. Alcides Aguilar on 5/6/2021 8:13 AM               DVT prophylaxis: [x] Lovenox                                 [] SCDs                                 [] SQ Heparin                                 [] Encourage ambulation           [] Already on Anticoagulation    Code Status: No Order      Disposition:    [x] Home likely pending stabilization       [] TCU       [] Rehab       [] Psych       [] SNF       [] [place]       [] Other-        Thank you Coby Hernandez MD for the opportunity to be involved in this patient's care.     Electronically signed by Demi Granados MD on 5/6/2021 at 10:12 AM

## 2021-05-06 NOTE — ED PROVIDER NOTES
Negative for palpitations and leg swelling. Gastrointestinal: Positive for nausea and vomiting. Negative for abdominal pain, blood in stool and diarrhea. Genitourinary: Negative for dysuria and hematuria. Musculoskeletal: Negative for back pain. Skin: Negative for rash. Neurological: Positive for dizziness and light-headedness. Negative for headaches. Psychiatric/Behavioral: Negative for agitation. PAST MEDICAL AND SURGICAL HISTORY     Past Medical History:   Diagnosis Date    Diabetes mellitus ([facility] [address]     Hyperlipidemia     Hypertension      History reviewed. No pertinent surgical history. MEDICATIONS     Current Facility-Administered Medications:     azithromycin (ZITHROMAX) 500 mg in D5W 250ml addavial, 500 mg, Intravenous, Once, Hilario Chow MD    Current Outpatient Medications:     metFORMIN (GLUCOPHAGE) 500 MG tablet, Take 500 mg by mouth daily (with breakfast) , Disp: , Rfl:     pravastatin (PRAVACHOL) 40 MG tablet, Take 40 mg by mouth daily, Disp: , Rfl:     Losartan Potassium-HCTZ (HYZAAR PO), Take by mouth, Disp: , Rfl:     NONFORMULARY, Unknown bp med, cholesterol med,, Disp: , Rfl:     esomeprazole Magnesium (NEXIUM) 20 MG PACK, Take 20 mg by mouth daily, Disp: , Rfl:       SOCIAL HISTORY     Social History     Social History Narrative    Not on file     Social History     Tobacco Use    Smoking status: Current Every Day Smoker     Packs/day: 1.00     Types: Cigarettes    Smokeless tobacco: Never Used   Substance Use Topics    Alcohol use: Yes     Comment: occasionally    Drug use: No         ALLERGIES   No Known Allergies      FAMILY HISTORY     Family History   Problem Relation Age of Onset    High Blood Pressure Father     High Cholesterol Father          PREVIOUS RECORDS   Previous records reviewed: Patient was seen in on 3/28/21 for acute non-recurrent frontal sinusitis.        PHYSICAL EXAM     ED Triage Vitals   BP Temp Temp src Pulse Resp SpO2 Height Weight   -- -- -- -- -- -- -- --     Initial vital signs and nursing assessment reviewed and abnormal from tachycardia . Pulsoximetry is normal per my interpretation. Additional Vital Signs:  Vitals:    05/06/21 0853   BP: 136/79   Pulse: 105   Resp: 20   Temp:    SpO2: 93%       Physical Exam  Vitals signs and nursing note reviewed. Constitutional:       General: He is in acute distress. Appearance: He is ill-appearing. HENT:      Head:      Comments: Mid scalp laceration approximately 3 cm in length and a centimeter in width upon withdrawing the skin. Is approximately 0.5 cm deep. There is no active bleeding. There are some dried blood around scalp. Right Ear: External ear normal.      Left Ear: External ear normal.      Nose: Nose normal.      Mouth/Throat:      Mouth: Mucous membranes are moist.      Pharynx: Oropharynx is clear. Eyes:      General: No visual field deficit or scleral icterus. Extraocular Movements: Extraocular movements intact. Conjunctiva/sclera: Conjunctivae normal.   Neck:      Musculoskeletal: Normal range of motion and neck supple. No neck rigidity or muscular tenderness. Cardiovascular:      Rate and Rhythm: Normal rate and regular rhythm. Pulses: Normal pulses. Heart sounds: Normal heart sounds. Pulmonary:      Effort: Pulmonary effort is normal. No respiratory distress. Breath sounds: Normal breath sounds. No wheezing. Abdominal:      General: Abdomen is flat. There is no distension. Palpations: Abdomen is soft. Tenderness: There is no abdominal tenderness. There is no guarding or rebound. Musculoskeletal: Normal range of motion. Right lower leg: No edema. Left lower leg: No edema. Lymphadenopathy:      Cervical: No cervical adenopathy. Skin:     General: Skin is warm and dry. Capillary Refill: Capillary refill takes less than 2 seconds. Coloration: Skin is not jaundiced.    Neurological: Mental Status: He is alert and oriented to person, place, and time. GCS: GCS eye subscore is 4. GCS verbal subscore is 5. GCS motor subscore is 6. Cranial Nerves: Cranial nerves are intact. No cranial nerve deficit, dysarthria or facial asymmetry. Sensory: Sensation is intact. No sensory deficit. Motor: No atrophy, abnormal muscle tone or pronator drift. Coordination: Coordination is intact. Coordination normal. Finger-Nose-Finger Test and Heel to [facility] Test normal.   Psychiatric:         Mood and Affect: Mood normal.         Behavior: Behavior normal.        Lac Repair    Date/Time: 5/6/2021 9:10 AM  Performed by: Marko Dos Santos MD  Authorized by: Kalani Hidalgo MD     Consent:     Consent obtained:  Verbal    Consent given by:  Patient    Risks discussed:  Infection, need for additional repair, pain, poor cosmetic result, poor wound healing, nerve damage, retained foreign body, tendon damage and vascular damage  Anesthesia (see MAR for exact dosages):      Anesthesia method:  Topical application    Topical anesthetic:  LET  Laceration details:     Location:  Scalp    Scalp location:  Mid-scalp    Length (cm):  6    Depth (mm):  5  Repair type:     Repair type:  Simple  Pre-procedure details:     Preparation:  Patient was prepped and draped in usual sterile fashion and imaging obtained to evaluate for foreign bodies  Exploration:     Hemostasis achieved with:  Direct pressure and LET    Wound exploration: wound explored through full range of motion      Wound extent: no fascia violation noted, no foreign bodies/material noted, no muscle damage noted, no nerve damage noted, no tendon damage noted, no underlying fracture noted and no vascular damage noted      Contaminated: no    Treatment:     Area cleansed with:  Hibiclens and Mel    Amount of cleaning:  Extensive    Irrigation solution:  Sterile saline    Irrigation volume:  200    Irrigation method:  Syringe    Visualized foreign bodies/material removed: no    Skin repair:     Repair method:  Staples    Number of staples:  6  Approximation:     Approximation:  Close  Post-procedure details:     Dressing:  Open (no dressing)    Patient tolerance of procedure: Tolerated well, no immediate complications        MEDICAL DECISION MAKING   Initial Assessment: This is a 49-year-old male with past medical history of hypertension, hyperlipidemia and diabetes mellitus on Metformin who comes in for progressive dizziness/lightheadedness over the past 2 days as well as a nonproductive cough and some chills as well as shortness of breath. He also mentions having some substernal chest pain that is burning in nature nonexertional nonpleuritic in nature nonradiating over the past 2 days as well. He also mentions having an episode of lightheadedness last night and falling in his kitchen hitting his head, has a 3 cm scalp laceration. He denies any LOC at the time. States he did not drive in last night due to his dizziness but symptoms improved and he drove this morning. He has no concerning focal neurological deficits on exam.    Differential Diagnosis Included but not limited to: intracranial bleed, ACS, Pneumonia, viral illness, intoxication, intracranial lesioin    MDM:   Patient story is concerning as he cannot fully recall events of his fall, he adamantly denies any alcohol use. To the significant scalp laceration and the difficulty recalling the entire events. A CT scan of the head will be ordered. Scalp will be repaired with staples as it approximates well. Laboratory studies will be ordered as well including EtOH level.         ED RESULTS   Laboratory results:  Labs Reviewed   CBC WITH AUTO DIFFERENTIAL - Abnormal; Notable for the following components:       Result Value    WBC 19.0 (*)     MCHC 36.1 (*)     MPV 8.8 (*)     Segs Absolute 16.9 (*)     Lymphocytes Absolute 0.7 (*)     Immature Grans (Abs) 0.10 (*)     All other components within normal limits   BASIC METABOLIC PANEL W/ REFLEX TO MG FOR LOW K - Abnormal; Notable for the following components:    Sodium 118 (*)     Chloride 70 (*)     Glucose 117 (*)     Calcium 12.8 (*)     All other components within normal limits   LACTATE, SEPSIS - Abnormal; Notable for the following components:    Lactic Acid, Sepsis 3.7 (*)     All other components within normal limits   ANION GAP - Abnormal; Notable for the following components:    Anion Gap 21.0 (*)     All other components within normal limits   GLOMERULAR FILTRATION RATE, ESTIMATED - Abnormal; Notable for the following components:    Est, Glom Filt Rate 77 (*)     All other components within normal limits   OSMOLALITY - Abnormal; Notable for the following components:    Osmolality Calc 242.5 (*)     All other components within normal limits   POCT GLUCOSE - Abnormal; Notable for the following components:    POC Glucose 137 (*)     All other components within normal limits   POCT GLUCOSE - Normal   COVID-19, RAPID   RAPID INFLUENZA A/B ANTIGENS   CULTURE, BLOOD 1   CULTURE, BLOOD 2   TROPONIN   ETHANOL   URINE DRUG SCREEN   LACTATE, SEPSIS       Radiologic studies results:  CT Head WO Contrast   Final Result   No acute intracranial process. **This report has been created using voice recognition software. It may contain minor errors which are inherent in voice recognition technology. **      Final report electronically signed by Dr. Webb Officer on 5/6/2021 8:27 AM      XR CHEST PORTABLE   Final Result   1. Normal heart size. No effusion. 2. Minimal atelectasis/pneumonia right cardiophrenic sulcus. **This report has been created using voice recognition software. It may contain minor errors which are inherent in voice recognition technology. **      Final report electronically signed by Dr. Webb Officer on 5/6/2021 8:13 AM          ED Medications administered this visit:   Medications   azithromycin (ZITHROMAX) 500 mg in D5W 250ml addavial (has no administration in time range)   0.9 % sodium chloride bolus (1,000 mLs Intravenous New Bag 5/6/21 0802)   ondansetron (ZOFRAN) injection 4 mg (4 mg Intravenous Given 5/6/21 0824)   cefTRIAXone (ROCEPHIN) 1000 mg IVPB in 50 mL D5W minibag (1,000 mg Intravenous New Bag 5/6/21 0832)   lidocaine-EPINEPHrine-tetracaine (LET) topical solution 3 mL syringe (3 mLs Topical Given 5/6/21 0832)         ED COURSE     ED Course as of May 06 0912   Thu May 06, 2021   0822 Significant for leukocytosis and elevated immature granulocytes, hemoglobin within normal limits and platelets within normal limits. CBC Auto Differential(!):    WBC 19.0(!)   RBC 4.99   Hemoglobin Quant 16.2   Hematocrit 44.9   MCV 90.0   MCH 32.5   MCHC 36.1(!)   RDW-CV 12.5   RDW-SD 40.6   Platelet Count 648   MPV 8.8(!)   Seg Neutrophils 89.2   Lymphocytes 3.8   Monocytes 6.3   Eosinophils 0.0   Basophils 0.2   Immature Granulocytes 0.5   Segs Absolute 16.9(!)   Lymphocytes Absolute 0.7(!)   Monocytes Absolute 1.2   Eosinophils Absolute 0.0   Basophils Absolute 0.0   Immature Grans (Abs) 0.10(!)   Nucleated Red Blood Cells 0 [AL]   0823 Glucose: 137 [AL]   0824 \"IMPRESSION:  1. Normal heart size. No effusion. 2. Minimal atelectasis/pneumonia right cardiophrenic sulcus. \"    Rocephin and blood culture as well as a lactate will be ordered. XR CHEST PORTABLE [AL]   0825 Troponin T: < 0.010 [AL]   0830 \"IMPRESSION:  No acute intracranial process. \"    Let gel was ordered and will repair scalp laceration shortly.        CT Head WO Contrast [AL]   0832 Sodium(!!): 118 [AL]   0832 Chloride(!): 70 [AL]   0833 Calcium(!): 12.8 [AL]   0853 ETHYL ALCOHOL, SERUM: 0.05 [AL]   0853 Est, Glom Filt Rate(!): 77 [AL]   0853 Anion Gap(!): 21.0 [AL]   0853 Osmolality Calc(!): 242.5 [AL]   0858 Lactic Acid, Sepsis(!): 3.7 [AL]   0900 Flu A Antigen: Negative [AL]   0900 SARS-CoV-2, NAAT: NOT DETECTED [AL]   3666 Patient was updated on his laboratory results as well as his imaging results. The need for admission was discussed with the patient he agrees. The hospitalist will be contacted, and they have agreed. [AL]      ED Course User Index  [AL] Jj Mckenzie MD       MEDICATION CHANGES     New Prescriptions    No medications on file         FINAL DISPOSITION     Final diagnoses:   Hyponatremia   Laceration of scalp, initial encounter   Pneumonia due to organism   Septicemia [facility])     Condition: condition: fair  Dispo: Admit to telemetry      This transcription was electronically signed. Parts of this transcriptions may have been dictated by use of voice recognition software and electronically transcribed, and parts may have been transcribed with the assistance of an ED scribe. The transcription may contain errors not detected in proofreading. Please refer to my supervising physician's documentation if my documentation differs.     Electronically Signed: Jj Mckenzie, 05/06/21, 9:12 AM         Jj Mckenzie MD  Resident  05/06/21 7440

## 2021-05-06 NOTE — ED TRIAGE NOTES
Pt to rm 05 per intake c/o productive cough, epigastric chest pain x 2 days. Also reports recent dizziness and chills. On arrival pt noted to have large laceration to right side of his head, bleeding controlled PTA. Pt states he got dizzy last night and fell into his trash can. Denies LOC at that time. States he didn't get checked out then because he was too dizzy to drive and he didn't want to call EMS. Pt drove himself in this am because he was 'less dizzy'. Chest pain is constant, describes as heartburn that doesn't go away with TUMS. Nausea, no emesis since 2 days ago. Chills and body aches.

## 2021-05-06 NOTE — ED NOTES
ED nurse-to-nurse bedside report    Chief Complaint   Patient presents with    Cough     productive    Chest Pain    Chills      LOC: alert and orientated to name, place, date  Vital signs   Vitals:    05/06/21 0823 05/06/21 0853 05/06/21 0933 05/06/21 1004   BP: 130/85 136/79 (!) 146/92 (!) 137/95   Pulse: 109 105 109 105   Resp: 20 20 20 20   Temp:       TempSrc:       SpO2: 94% 93% 93% 94%   Weight:       Height:          Pain:   Pain Interventions:   Pain Goal:   Oxygen: RA at this time    Current needs required Send UA when able. Telemetry: Yes  LDAs:   Peripheral IV 05/06/21 Right Antecubital (Active)   Site Assessment Clean;Dry; Intact 05/06/21 0757   Line Status Flushed;Brisk blood return;Specimen collected 05/06/21 0757   Dressing Status Clean;Dry; Intact 05/06/21 0757   Dressing Intervention New 05/06/21 0757     Continuous Infusions:   Mobility: bedrest  Bolaños Fall Risk Score: No flowsheet data found.   Fall Interventions:   Report given to: Anna Paredes RN  05/06/21 0792

## 2021-05-06 NOTE — ED NOTES
Pt continues restful on cart, resp easy and non labored. Updated on POC. Waiting Dr Roberto Rivera to repair laceration. VSS.      Dunia Roberts, NORA  05/06/21 8437

## 2021-05-06 NOTE — ED NOTES
ED to inpatient nurses report    Chief Complaint   Patient presents with    Cough     productive    Chest Pain    Chills      Present to ED from home  LOC: alert and orientated to name, place, date  Vital signs   Vitals:    05/06/21 1054 05/06/21 1124 05/06/21 1154 05/06/21 1156   BP: (!) 150/88 (!) 143/80 137/79    Pulse: 108 104 104 108   Resp: 15 17 17 27   Temp:       TempSrc:       SpO2: 95% 92% 91% 94%   Weight:       Height:          Oxygen Baseline room air    Current needs required room air Bipap/Cpap No  LDAs:   Peripheral IV 05/06/21 Right Antecubital (Active)   Site Assessment Clean;Dry; Intact 05/06/21 1055   Line Status Infusing 05/06/21 1055   Dressing Status Clean;Dry; Intact 05/06/21 1055   Dressing Intervention New 05/06/21 2757     Mobility: Requires assistance * 1  Pending ED orders: none  Present condition: stable      Electronically signed by Kole Wasserman RN on 5/6/2021 at 12:29 PM     Asa Christy RN  05/06/21 5547

## 2021-05-06 NOTE — ED NOTES
Pt restful on cart, VSS. resp easy and non labored. Laceration repair complete. 6 guanakito placed per Dr Marcy Banerjee. Pt tolerated well.       Adriana Hernandez RN  05/06/21 4333

## 2021-05-06 NOTE — ED NOTES
Pt resting on cot, call light within reach. Urine specimen collected and sent. No concerns voiced at this time. Pt updated on plan of care.       Asa Christy RN  05/06/21 1200

## 2021-05-07 LAB
ANION GAP SERPL CALCULATED.3IONS-SCNC: 11 MEQ/L (ref 8–16)
ANION GAP SERPL CALCULATED.3IONS-SCNC: 12 MEQ/L (ref 8–16)
ANION GAP SERPL CALCULATED.3IONS-SCNC: 15 MEQ/L (ref 8–16)
BASOPHILS # BLD: 0.2 %
BASOPHILS ABSOLUTE: 0 THOU/MM3 (ref 0–0.1)
BUN BLDV-MCNC: 12 MG/DL (ref 7–22)
BUN BLDV-MCNC: 12 MG/DL (ref 7–22)
BUN BLDV-MCNC: 15 MG/DL (ref 7–22)
CALCIUM SERPL-MCNC: 10.1 MG/DL (ref 8.5–10.5)
CALCIUM SERPL-MCNC: 10.4 MG/DL (ref 8.5–10.5)
CALCIUM SERPL-MCNC: 9.3 MG/DL (ref 8.5–10.5)
CHLORIDE BLD-SCNC: 90 MEQ/L (ref 98–111)
CHLORIDE BLD-SCNC: 94 MEQ/L (ref 98–111)
CHLORIDE BLD-SCNC: 96 MEQ/L (ref 98–111)
CO2: 23 MEQ/L (ref 23–33)
CO2: 24 MEQ/L (ref 23–33)
CO2: 27 MEQ/L (ref 23–33)
CORTISOL COLLECTION INFO: NORMAL
CORTISOL: 10.97 UG/DL
CREAT SERPL-MCNC: 0.7 MG/DL (ref 0.4–1.2)
CREAT SERPL-MCNC: 0.7 MG/DL (ref 0.4–1.2)
CREAT SERPL-MCNC: 0.8 MG/DL (ref 0.4–1.2)
EOSINOPHIL # BLD: 0.1 %
EOSINOPHILS ABSOLUTE: 0 THOU/MM3 (ref 0–0.4)
ERYTHROCYTE [DISTWIDTH] IN BLOOD BY AUTOMATED COUNT: 13 % (ref 11.5–14.5)
ERYTHROCYTE [DISTWIDTH] IN BLOOD BY AUTOMATED COUNT: 44.1 FL (ref 35–45)
GFR SERPL CREATININE-BSD FRML MDRD: > 90 ML/MIN/1.73M2
GLUCOSE BLD-MCNC: 103 MG/DL (ref 70–108)
GLUCOSE BLD-MCNC: 105 MG/DL (ref 70–108)
GLUCOSE BLD-MCNC: 91 MG/DL (ref 70–108)
GLUCOSE BLD-MCNC: 96 MG/DL (ref 70–108)
HCT VFR BLD CALC: 40.3 % (ref 42–52)
HEMOGLOBIN: 14 GM/DL (ref 14–18)
IMMATURE GRANS (ABS): 0.09 THOU/MM3 (ref 0–0.07)
IMMATURE GRANULOCYTES: 0.7 %
LYMPHOCYTES # BLD: 4.3 %
LYMPHOCYTES ABSOLUTE: 0.6 THOU/MM3 (ref 1–4.8)
MCH RBC QN AUTO: 32.3 PG (ref 26–33)
MCHC RBC AUTO-ENTMCNC: 34.7 GM/DL (ref 32.2–35.5)
MCV RBC AUTO: 92.9 FL (ref 80–94)
MONOCYTES # BLD: 9.1 %
MONOCYTES ABSOLUTE: 1.2 THOU/MM3 (ref 0.4–1.3)
NUCLEATED RED BLOOD CELLS: 0 /100 WBC
PLATELET # BLD: 204 THOU/MM3 (ref 130–400)
PMV BLD AUTO: 9.2 FL (ref 9.4–12.4)
POTASSIUM REFLEX MAGNESIUM: 4.2 MEQ/L (ref 3.5–5.2)
POTASSIUM SERPL-SCNC: 4.2 MEQ/L (ref 3.5–5.2)
POTASSIUM SERPL-SCNC: 4.2 MEQ/L (ref 3.5–5.2)
POTASSIUM SERPL-SCNC: 4.6 MEQ/L (ref 3.5–5.2)
PROSTATE SPECIFIC ANTIGEN: 1.43 NG/ML (ref 0–1)
RBC # BLD: 4.34 MILL/MM3 (ref 4.7–6.1)
REASON FOR REJECTION: NORMAL
REJECTED TEST: NORMAL
RESPIRATORY CULTURE: NORMAL
SEG NEUTROPHILS: 85.6 %
SEGMENTED NEUTROPHILS ABSOLUTE COUNT: 11.5 THOU/MM3 (ref 1.8–7.7)
SODIUM BLD-SCNC: 125 MEQ/L (ref 135–145)
SODIUM BLD-SCNC: 129 MEQ/L (ref 135–145)
SODIUM BLD-SCNC: 129 MEQ/L (ref 135–145)
SODIUM BLD-SCNC: 130 MEQ/L (ref 135–145)
SODIUM BLD-SCNC: 133 MEQ/L (ref 135–145)
SODIUM BLD-SCNC: 134 MEQ/L (ref 135–145)
WBC # BLD: 13.4 THOU/MM3 (ref 4.8–10.8)

## 2021-05-07 PROCEDURE — 82533 TOTAL CORTISOL: CPT

## 2021-05-07 PROCEDURE — 2580000003 HC RX 258: Performed by: STUDENT IN AN ORGANIZED HEALTH CARE EDUCATION/TRAINING PROGRAM

## 2021-05-07 PROCEDURE — 84295 ASSAY OF SERUM SODIUM: CPT

## 2021-05-07 PROCEDURE — 2060000000 HC ICU INTERMEDIATE R&B

## 2021-05-07 PROCEDURE — 82948 REAGENT STRIP/BLOOD GLUCOSE: CPT

## 2021-05-07 PROCEDURE — 2580000003 HC RX 258: Performed by: HOSPITALIST

## 2021-05-07 PROCEDURE — 80048 BASIC METABOLIC PNL TOTAL CA: CPT

## 2021-05-07 PROCEDURE — 6360000002 HC RX W HCPCS: Performed by: STUDENT IN AN ORGANIZED HEALTH CARE EDUCATION/TRAINING PROGRAM

## 2021-05-07 PROCEDURE — 84153 ASSAY OF PSA TOTAL: CPT

## 2021-05-07 PROCEDURE — 86335 IMMUNFIX E-PHORSIS/URINE/CSF: CPT

## 2021-05-07 PROCEDURE — 36415 COLL VENOUS BLD VENIPUNCTURE: CPT

## 2021-05-07 PROCEDURE — 6370000000 HC RX 637 (ALT 250 FOR IP): Performed by: STUDENT IN AN ORGANIZED HEALTH CARE EDUCATION/TRAINING PROGRAM

## 2021-05-07 PROCEDURE — 85025 COMPLETE CBC W/AUTO DIFF WBC: CPT

## 2021-05-07 PROCEDURE — 84156 ASSAY OF PROTEIN URINE: CPT

## 2021-05-07 PROCEDURE — 99233 SBSQ HOSP IP/OBS HIGH 50: CPT | Performed by: HOSPITALIST

## 2021-05-07 RX ORDER — SODIUM CHLORIDE 9 MG/ML
INJECTION, SOLUTION INTRAVENOUS CONTINUOUS
Status: DISCONTINUED | OUTPATIENT
Start: 2021-05-07 | End: 2021-05-08

## 2021-05-07 RX ORDER — SODIUM CHLORIDE 450 MG/100ML
INJECTION, SOLUTION INTRAVENOUS CONTINUOUS
Status: DISCONTINUED | OUTPATIENT
Start: 2021-05-07 | End: 2021-05-07

## 2021-05-07 RX ADMIN — AZITHROMYCIN DIHYDRATE 500 MG: 500 INJECTION, POWDER, LYOPHILIZED, FOR SOLUTION INTRAVENOUS at 09:52

## 2021-05-07 RX ADMIN — ENOXAPARIN SODIUM 40 MG: 40 INJECTION SUBCUTANEOUS at 16:56

## 2021-05-07 RX ADMIN — METOPROLOL TARTRATE 25 MG: 25 TABLET, FILM COATED ORAL at 22:51

## 2021-05-07 RX ADMIN — PRAVASTATIN SODIUM 40 MG: 40 TABLET ORAL at 22:51

## 2021-05-07 RX ADMIN — SODIUM CHLORIDE: 9 INJECTION, SOLUTION INTRAVENOUS at 03:14

## 2021-05-07 RX ADMIN — SODIUM CHLORIDE: 4.5 INJECTION, SOLUTION INTRAVENOUS at 09:52

## 2021-05-07 RX ADMIN — METOPROLOL TARTRATE 25 MG: 25 TABLET, FILM COATED ORAL at 11:41

## 2021-05-07 RX ADMIN — CEFTRIAXONE SODIUM 1000 MG: 1 INJECTION, POWDER, FOR SOLUTION INTRAMUSCULAR; INTRAVENOUS at 08:32

## 2021-05-07 RX ADMIN — FAMOTIDINE 20 MG: 20 TABLET ORAL at 22:51

## 2021-05-07 RX ADMIN — SODIUM CHLORIDE, PRESERVATIVE FREE 10 ML: 5 INJECTION INTRAVENOUS at 22:52

## 2021-05-07 RX ADMIN — FAMOTIDINE 20 MG: 20 TABLET ORAL at 08:31

## 2021-05-07 RX ADMIN — SODIUM CHLORIDE: 9 INJECTION, SOLUTION INTRAVENOUS at 16:57

## 2021-05-07 ASSESSMENT — PAIN SCALES - GENERAL
PAINLEVEL_OUTOF10: 0

## 2021-05-07 NOTE — PROGRESS NOTES
PROGRESS NOTE      Patient:  Hannah Wallace      Unit/Bed:[facility]22/022-A    YOB: 1965    MRN: [id number]       Acct: [de-identified]     PCP: Jennifer Vaughn MD    Date of Admission: 5/6/2021      Assessment/Plan:    Anticipated Discharge in : 1-2 days    Sepsis 2/2 Pneumonia   - SIRS criteria (, WBC 19), CXR showing right cardiophrenic pneumonia vs atelectasis   - Initial Lactic acid of 3.7, repeat 1.4  - Sepsis fluids not given due to hyponatremia   - Continue IV antibiotics  - Urine legionella pending   - Urine Strep, MRSA negative   - Sputum culture pending      Subacute vs chronic Hypotonic Hypovolemic Hyponatremia  - 2/2 to idehydration and diuretic use   - Not actively seizing. No confusion or obtundation   - Initial Na of 118. Received 2.5L NS bolus in ED.     05/07: Na this morning was 129. Repeat. Switch to half NS IVF @ 100mL/hr  - Monitor Na every 4 hours, do not overcorrect. Do not exceed 8-10mEq per 24 hours  - Urine lytes indicative of pre-renal causes   - TSH, AM cortisol WNL     Hypercalcemia, resolved    - PTH independent  - Initial calcium of 12.8, now 10.1  - intact PTH suppressed  - Alk phos and  Vit D WNL  - IVF hydration with NS 100mL/hr  - Continue hold thiazide diuretic   - PSA and immunofixation pending     Lactic acidosis, resolved    - 2/2 sepsis vs dehydration. Initial 3.7. Repeat 1.4  - IVF hydration      Metabolic acidosis with anion gap, resolved   - 2/2 lactic acidosis  - Initial AG of 21, now 15  - Treatment as above  - Continue to hold metformin   - Continue to monitor      Leukocytosis, improving    - 2/2 sepsis, treatment as above  - cont to monitor      Dehydration, improving   - 2/2 poor intake and vomiting  - initial elevated lactic acid, AG, calcium, and decreased GFR  - Received 2.5L IV in ED. Cont IV fluids  - monitor      HTN  - Continue to hold home hyzar due to hyponatremia  - Will add low dose BB for HR and BP with hold parameters   - Continue to monitor     DM  - Well controlled. Continue hold glucophage for metabolic acidosis. - Start SSI and hypoglycemia protocol      HLD  - Continue home statin      Wheezing on exam  - Denies SOB at this time. States never diagnosed with COPD/emphesema or asthma   - Uses spiriva at home as needed   - PRN lauro      Chief Complaint: SOB, chest pain, lightheaded     Hospital Course:   60 y.o. male who with past medical history of hypertension, diabetes, hyperlipidemia presented to [address] [place] [address] with shortness of breath, cough, chest pain, lightheadedness. He states his symptoms started 2 days ago while he was at work. He works at [employer] as a [place] . States that he started with a cough productive of yellow sputum. Later developed nonradiating substernal pressure like chest pain. He also reports associated shortness of breath, lightheadedness, decreased appetite, nausea and vomiting. Symptoms are constant. Reports 6 episodes of nonbilious nonbloody vomiting or for the past couple days. Denies fevers, chills, abdominal pain, hematuria dysuria, blood in the stools, diarrhea constipation. Denies any sick contacts, recent travel, recent change in food. Denies exposure to any chemicals while at work. He admits to smoking 1 pack a day for the past 20 years. Ports occasional alcohol use. Denies any other drug use.     In the ED the patient presented with an elevated heart rate at 108, afebrile, rest of vitals within normal limits. Initial EKG was negative for ischemia. Chest x-ray read as minimal atelectasis versus pneumonia in the right cardiophrenic sulcus. CT head was negative. Labs were significant for hyponatremia at 118, hyperkalemia 12.8, anion gap of 21, chloride of 70, elevated white count of 19. Patient received 2.5 L of normal saline and was started on IV antibiotics. Patient was then admitted for further evaluation and management.      Subjective:   Patient seen and examined this morning during rounds. He appears comforable with no evidence of acute cardiopulmonary distress. BP was elevated at 170/86, HR was 105, rest of vitals stable. States that he is feeling better. States that his lightheadness, chest pain, SOB, nausea, vomiting have resolved. Still having a cough productive of yellow sputum. Sputum culture pending. Did have increase of his sodium, now 129. Switched to hypotonic saline. Medications:  Reviewed    Infusion Medications    sodium chloride      sodium chloride      dextrose       Scheduled Medications    cefTRIAXone (ROCEPHIN) IV  1,000 mg Intravenous Q24H    pravastatin  40 mg Oral Nightly    sodium chloride flush  5-40 mL Intravenous 2 times per day    enoxaparin  40 mg Subcutaneous Q24H    famotidine  20 mg Oral BID    insulin lispro  0-6 Units Subcutaneous TID WC    insulin lispro  0-3 Units Subcutaneous Nightly    azithromycin  500 mg Intravenous Q24H     PRN Meds: sodium chloride flush, sodium chloride, promethazine **OR** ondansetron, polyethylene glycol, acetaminophen **OR** acetaminophen, glucose, dextrose, glucagon (rDNA), dextrose      Intake/Output Summary (Last 24 hours) at 5/7/2021 0924  Last data filed at 5/7/2021 0313  Gross per 24 hour   Intake 1277.78 ml   Output --   Net 1277.78 ml       Diet:  DIET GENERAL;    Exam:  BP (!) 142/83   Pulse 98   Temp 97.8 °F (36.6 °C) (Oral)   Resp 18   Ht 5' 11\" (1.803 m)   Wt 186 lb (84.4 kg)   SpO2 95%   BMI 25.94 kg/m²     General appearance:  No apparent distress, appears stated age and cooperative. HEENT:  Normal cephalic, atraumatic without obvious deformity. Pupils equal, round, and reactive to light. Extra ocular muscles intact. Conjunctivae/corneas clear. Neck: Supple, with full range of motion. No jugular venous distention. Trachea midline. Respiratory:  Normal respiratory effort. Diffuse expiratory wheezing bilaterally.    Cardiovascular:  Regular rate and rhythm with normal S1/S2 without murmurs, rubs or gallops. Abdomen: Soft, non-tender, non-distended with normal bowel sounds. Musculoskeletal:  No clubbing, cyanosis or edema bilaterally. Full range of motion without deformity. Skin: Skin color, texture, turgor normal.  No rashes or lesions. Neurologic:  Neurovascularly intact without any focal sensory/motor deficits. Cranial nerves: II-XII intact, grossly non-focal.  Psychiatric:  Alert and oriented, thought content appropriate, normal insight  Capillary Refill: Brisk,< 3 seconds   Peripheral Pulses: +2 palpable, equal bilaterally       Labs:   Recent Labs     05/06/21 0757 05/07/21  0652   WBC 19.0* 13.4*   HGB 16.2 14.0   HCT 44.9 40.3*    204     Recent Labs     05/06/21 0757 05/06/21  0757 05/06/21  1916 05/06/21  2322 05/07/21  0337 05/07/21  0652   *   < > 123* 123* 129* 134*   K 4.1  --   --   --   --  4.2   CL 70*  --   --   --   --  96*   CO2 27  --   --   --   --  23   BUN 21  --   --   --   --  12   CREATININE 1.0  --   --   --   --  0.8   CALCIUM 12.8*   < > 10.2 9.7  --  10.1    < > = values in this interval not displayed. Recent Labs     05/06/21  1528   AST 23   ALT 10*   BILIDIR <0.2   BILITOT 0.4   ALKPHOS 72     No results for input(s): INR in the last 72 hours. No results for input(s): Teri Grumet in the last 72 hours. Urinalysis:    No results found for: Leila Solum, BACTERIA, RBCUA, BLOODU, [address], [address]    Radiology:  CT Head WO Contrast   Final Result   No acute intracranial process. **This report has been created using voice recognition software. It may contain minor errors which are inherent in voice recognition technology. **      Final report electronically signed by Dr. Brandon Perez on 5/6/2021 8:27 AM      XR CHEST PORTABLE   Final Result   1. Normal heart size. No effusion. 2. Minimal atelectasis/pneumonia right cardiophrenic sulcus.             **This report has been created using voice recognition

## 2021-05-07 NOTE — PROGRESS NOTES
This RN talked to Melum 62 (sons of patient) and updated them on patient status. No further questions at this time.

## 2021-05-08 ENCOUNTER — APPOINTMENT (OUTPATIENT)
Dept: CT IMAGING | Age: 56
DRG: 871 | End: 2021-05-08
Payer: COMMERCIAL

## 2021-05-08 ENCOUNTER — APPOINTMENT (OUTPATIENT)
Dept: MRI IMAGING | Age: 56
DRG: 871 | End: 2021-05-08
Payer: COMMERCIAL

## 2021-05-08 LAB
AMPHETAMINE+METHAMPHETAMINE URINE SCREEN: NEGATIVE
ANION GAP SERPL CALCULATED.3IONS-SCNC: 16 MEQ/L (ref 8–16)
BARBITURATE QUANTITATIVE URINE: NEGATIVE
BASOPHILS # BLD: 0.6 %
BASOPHILS ABSOLUTE: 0.1 THOU/MM3 (ref 0–0.1)
BENZODIAZEPINE QUANTITATIVE URINE: NEGATIVE
BUN BLDV-MCNC: 14 MG/DL (ref 7–22)
CALCIUM SERPL-MCNC: 8.8 MG/DL (ref 8.5–10.5)
CALCIUM SERPL-MCNC: 8.9 MG/DL (ref 8.5–10.5)
CALCIUM SERPL-MCNC: 9 MG/DL (ref 8.5–10.5)
CALCIUM SERPL-MCNC: 9 MG/DL (ref 8.5–10.5)
CALCIUM SERPL-MCNC: 9.2 MG/DL (ref 8.5–10.5)
CANNABINOID QUANTITATIVE URINE: NEGATIVE
CHLORIDE BLD-SCNC: 94 MEQ/L (ref 98–111)
CO2: 21 MEQ/L (ref 23–33)
COCAINE METABOLITE QUANTITATIVE URINE: NEGATIVE
CREAT SERPL-MCNC: 0.5 MG/DL (ref 0.4–1.2)
EKG ATRIAL RATE: 111 BPM
EKG P AXIS: 84 DEGREES
EKG P-R INTERVAL: 168 MS
EKG Q-T INTERVAL: 334 MS
EKG QRS DURATION: 92 MS
EKG QTC CALCULATION (BAZETT): 454 MS
EKG R AXIS: 98 DEGREES
EKG T AXIS: 79 DEGREES
EKG VENTRICULAR RATE: 111 BPM
EOSINOPHIL # BLD: 0.6 %
EOSINOPHILS ABSOLUTE: 0.1 THOU/MM3 (ref 0–0.4)
ERYTHROCYTE [DISTWIDTH] IN BLOOD BY AUTOMATED COUNT: 13.1 % (ref 11.5–14.5)
ERYTHROCYTE [DISTWIDTH] IN BLOOD BY AUTOMATED COUNT: 46.5 FL (ref 35–45)
GFR SERPL CREATININE-BSD FRML MDRD: > 90 ML/MIN/1.73M2
GLUCOSE BLD-MCNC: 82 MG/DL (ref 70–108)
HCT VFR BLD CALC: 36.6 % (ref 42–52)
HEMOGLOBIN: 12.2 GM/DL (ref 14–18)
IMMATURE GRANS (ABS): 0.08 THOU/MM3 (ref 0–0.07)
IMMATURE GRANULOCYTES: 0.8 %
LEGIONELLA PNEUMOPHILIA AG, URINE: NEGATIVE
LYMPHOCYTES # BLD: 9.4 %
LYMPHOCYTES ABSOLUTE: 0.9 THOU/MM3 (ref 1–4.8)
MCH RBC QN AUTO: 32.3 PG (ref 26–33)
MCHC RBC AUTO-ENTMCNC: 33.3 GM/DL (ref 32.2–35.5)
MCV RBC AUTO: 96.8 FL (ref 80–94)
MONOCYTES # BLD: 11.6 %
MONOCYTES ABSOLUTE: 1.1 THOU/MM3 (ref 0.4–1.3)
MRSA SCREEN: NORMAL
NUCLEATED RED BLOOD CELLS: 0 /100 WBC
OPIATES, URINE: NEGATIVE
OXYCODONE: NEGATIVE
PHENCYCLIDINE QUANTITATIVE URINE: NEGATIVE
PLATELET # BLD: 192 THOU/MM3 (ref 130–400)
PMV BLD AUTO: 9.2 FL (ref 9.4–12.4)
POTASSIUM SERPL-SCNC: 3.7 MEQ/L (ref 3.5–5.2)
RBC # BLD: 3.78 MILL/MM3 (ref 4.7–6.1)
SEG NEUTROPHILS: 77 %
SEGMENTED NEUTROPHILS ABSOLUTE COUNT: 7.5 THOU/MM3 (ref 1.8–7.7)
SODIUM BLD-SCNC: 129 MEQ/L (ref 135–145)
SODIUM BLD-SCNC: 131 MEQ/L (ref 135–145)
SODIUM BLD-SCNC: 131 MEQ/L (ref 135–145)
SODIUM BLD-SCNC: 133 MEQ/L (ref 135–145)
SODIUM BLD-SCNC: 135 MEQ/L (ref 135–145)
STREP PNEUMO AG, UR: NEGATIVE
WBC # BLD: 9.8 THOU/MM3 (ref 4.8–10.8)

## 2021-05-08 PROCEDURE — 6360000002 HC RX W HCPCS: Performed by: FAMILY MEDICINE

## 2021-05-08 PROCEDURE — 2580000003 HC RX 258: Performed by: INTERNAL MEDICINE

## 2021-05-08 PROCEDURE — 6360000002 HC RX W HCPCS: Performed by: INTERNAL MEDICINE

## 2021-05-08 PROCEDURE — 93010 ELECTROCARDIOGRAM REPORT: CPT | Performed by: INTERNAL MEDICINE

## 2021-05-08 PROCEDURE — 99254 IP/OBS CNSLTJ NEW/EST MOD 60: CPT | Performed by: INTERNAL MEDICINE

## 2021-05-08 PROCEDURE — 84295 ASSAY OF SERUM SODIUM: CPT

## 2021-05-08 PROCEDURE — A9579 GAD-BASE MR CONTRAST NOS,1ML: HCPCS | Performed by: INTERNAL MEDICINE

## 2021-05-08 PROCEDURE — 70450 CT HEAD/BRAIN W/O DYE: CPT

## 2021-05-08 PROCEDURE — 80048 BASIC METABOLIC PNL TOTAL CA: CPT

## 2021-05-08 PROCEDURE — 99291 CRITICAL CARE FIRST HOUR: CPT | Performed by: INTERNAL MEDICINE

## 2021-05-08 PROCEDURE — 80307 DRUG TEST PRSMV CHEM ANLYZR: CPT

## 2021-05-08 PROCEDURE — 85025 COMPLETE CBC W/AUTO DIFF WBC: CPT

## 2021-05-08 PROCEDURE — 6360000002 HC RX W HCPCS: Performed by: STUDENT IN AN ORGANIZED HEALTH CARE EDUCATION/TRAINING PROGRAM

## 2021-05-08 PROCEDURE — 6370000000 HC RX 637 (ALT 250 FOR IP): Performed by: STUDENT IN AN ORGANIZED HEALTH CARE EDUCATION/TRAINING PROGRAM

## 2021-05-08 PROCEDURE — 6360000004 HC RX CONTRAST MEDICATION: Performed by: INTERNAL MEDICINE

## 2021-05-08 PROCEDURE — 2580000003 HC RX 258: Performed by: STUDENT IN AN ORGANIZED HEALTH CARE EDUCATION/TRAINING PROGRAM

## 2021-05-08 PROCEDURE — 2000000000 HC ICU R&B

## 2021-05-08 PROCEDURE — 70553 MRI BRAIN STEM W/O & W/DYE: CPT

## 2021-05-08 PROCEDURE — 82310 ASSAY OF CALCIUM: CPT

## 2021-05-08 PROCEDURE — 2500000003 HC RX 250 WO HCPCS: Performed by: FAMILY MEDICINE

## 2021-05-08 PROCEDURE — 2580000003 HC RX 258: Performed by: FAMILY MEDICINE

## 2021-05-08 PROCEDURE — 36415 COLL VENOUS BLD VENIPUNCTURE: CPT

## 2021-05-08 PROCEDURE — 6370000000 HC RX 637 (ALT 250 FOR IP): Performed by: INTERNAL MEDICINE

## 2021-05-08 RX ORDER — LANOLIN ALCOHOL/MO/W.PET/CERES
100 CREAM (GRAM) TOPICAL DAILY
Status: DISCONTINUED | OUTPATIENT
Start: 2021-05-08 | End: 2021-05-11 | Stop reason: HOSPADM

## 2021-05-08 RX ORDER — MULTIVITAMIN WITH IRON
1 TABLET ORAL DAILY
Status: DISCONTINUED | OUTPATIENT
Start: 2021-05-08 | End: 2021-05-11 | Stop reason: HOSPADM

## 2021-05-08 RX ORDER — PHENOBARBITAL SODIUM 65 MG/ML
130 INJECTION INTRAMUSCULAR
Status: DISCONTINUED | OUTPATIENT
Start: 2021-05-08 | End: 2021-05-11 | Stop reason: HOSPADM

## 2021-05-08 RX ORDER — PHENOBARBITAL SODIUM 65 MG/ML
260 INJECTION INTRAMUSCULAR
Status: DISCONTINUED | OUTPATIENT
Start: 2021-05-08 | End: 2021-05-11 | Stop reason: HOSPADM

## 2021-05-08 RX ORDER — FOLIC ACID 1 MG/1
1 TABLET ORAL DAILY
Status: DISCONTINUED | OUTPATIENT
Start: 2021-05-08 | End: 2021-05-11 | Stop reason: HOSPADM

## 2021-05-08 RX ORDER — NICOTINE 21 MG/24HR
1 PATCH, TRANSDERMAL 24 HOURS TRANSDERMAL DAILY
Status: DISCONTINUED | OUTPATIENT
Start: 2021-05-08 | End: 2021-05-09

## 2021-05-08 RX ORDER — LORAZEPAM 2 MG/ML
1 INJECTION INTRAMUSCULAR ONCE
Status: COMPLETED | OUTPATIENT
Start: 2021-05-08 | End: 2021-05-08

## 2021-05-08 RX ORDER — DEXTROSE MONOHYDRATE 50 MG/ML
INJECTION, SOLUTION INTRAVENOUS CONTINUOUS
Status: DISCONTINUED | OUTPATIENT
Start: 2021-05-08 | End: 2021-05-10

## 2021-05-08 RX ADMIN — SODIUM CHLORIDE 0.8 MCG/KG/HR: 9 INJECTION, SOLUTION INTRAVENOUS at 13:10

## 2021-05-08 RX ADMIN — PRAVASTATIN SODIUM 40 MG: 40 TABLET ORAL at 20:55

## 2021-05-08 RX ADMIN — SODIUM CHLORIDE 0.3 MCG/KG/HR: 9 INJECTION, SOLUTION INTRAVENOUS at 07:48

## 2021-05-08 RX ADMIN — CEFTRIAXONE SODIUM 1000 MG: 1 INJECTION, POWDER, FOR SOLUTION INTRAMUSCULAR; INTRAVENOUS at 09:49

## 2021-05-08 RX ADMIN — Medication 100 MG: at 17:17

## 2021-05-08 RX ADMIN — PHENOBARBITAL SODIUM 260 MG: 65 INJECTION INTRAMUSCULAR at 03:40

## 2021-05-08 RX ADMIN — DEXTROSE MONOHYDRATE 250 ML: 50 INJECTION, SOLUTION INTRAVENOUS at 20:51

## 2021-05-08 RX ADMIN — SODIUM CHLORIDE 84 MCG: 9 INJECTION, SOLUTION INTRAVENOUS at 07:37

## 2021-05-08 RX ADMIN — SODIUM CHLORIDE, PRESERVATIVE FREE 10 ML: 5 INJECTION INTRAVENOUS at 07:36

## 2021-05-08 RX ADMIN — AZITHROMYCIN DIHYDRATE 500 MG: 500 INJECTION, POWDER, LYOPHILIZED, FOR SOLUTION INTRAVENOUS at 08:20

## 2021-05-08 RX ADMIN — SODIUM CHLORIDE, PRESERVATIVE FREE 10 ML: 5 INJECTION INTRAVENOUS at 20:54

## 2021-05-08 RX ADMIN — METOPROLOL TARTRATE 25 MG: 25 TABLET, FILM COATED ORAL at 20:55

## 2021-05-08 RX ADMIN — GADOTERIDOL 15 ML: 279.3 INJECTION, SOLUTION INTRAVENOUS at 16:35

## 2021-05-08 RX ADMIN — DEXTROSE MONOHYDRATE 500 ML: 50 INJECTION, SOLUTION INTRAVENOUS at 12:47

## 2021-05-08 RX ADMIN — ENOXAPARIN SODIUM 40 MG: 40 INJECTION SUBCUTANEOUS at 17:17

## 2021-05-08 RX ADMIN — FAMOTIDINE 20 MG: 20 TABLET ORAL at 20:55

## 2021-05-08 RX ADMIN — SODIUM CHLORIDE 0.8 MCG/KG/HR: 9 INJECTION, SOLUTION INTRAVENOUS at 18:36

## 2021-05-08 RX ADMIN — METOPROLOL TARTRATE 25 MG: 25 TABLET, FILM COATED ORAL at 08:34

## 2021-05-08 RX ADMIN — Medication 1 TABLET: at 17:17

## 2021-05-08 RX ADMIN — FOLIC ACID 1 MG: 1 TABLET ORAL at 17:17

## 2021-05-08 RX ADMIN — LORAZEPAM 1 MG: 2 INJECTION INTRAMUSCULAR; INTRAVENOUS at 15:36

## 2021-05-08 RX ADMIN — FAMOTIDINE 20 MG: 20 TABLET ORAL at 08:34

## 2021-05-08 RX ADMIN — PHENOBARBITAL SODIUM 260 MG: 65 INJECTION INTRAMUSCULAR at 06:07

## 2021-05-08 ASSESSMENT — PAIN SCALES - GENERAL: PAINLEVEL_OUTOF10: 0

## 2021-05-08 NOTE — PROGRESS NOTES
Patient was then admitted for further evaluation and management. Subjective:   Rapid response called overnight patient was agitated and confused. Security had to be called. Additional history reveals that he does have consistent alcohol use and there was concern for acute alcohol withdrawal.  Attempts at giving high-dose phenobarbital were made, however, patient remained agitated. Was brought over to the ICU for Precedex drip. Patient is now more calm and relaxed. Medications:  Reviewed    Infusion Medications    dexmedetomidine 0.8 mcg/kg/hr (05/08/21 1310)    dextrose      sodium chloride      dextrose       Scheduled Medications    dextrose 5%  500 mL Intravenous Once    nicotine  1 patch Transdermal Daily    metoprolol tartrate  25 mg Oral BID    cefTRIAXone (ROCEPHIN) IV  1,000 mg Intravenous Q24H    pravastatin  40 mg Oral Nightly    sodium chloride flush  5-40 mL Intravenous 2 times per day    enoxaparin  40 mg Subcutaneous Q24H    famotidine  20 mg Oral BID    azithromycin  500 mg Intravenous Q24H     PRN Meds: PHENobarbital **OR** PHENobarbital **OR** PHENobarbital IVPB, sodium chloride flush, sodium chloride, promethazine **OR** ondansetron, polyethylene glycol, acetaminophen **OR** acetaminophen, glucose, dextrose, glucagon (rDNA), dextrose      Intake/Output Summary (Last 24 hours) at 5/8/2021 1408  Last data filed at 5/8/2021 0800  Gross per 24 hour   Intake 2913.84 ml   Output 0 ml   Net 2913.84 ml       Diet:  DIET GENERAL; Daily Fluid Restriction: 1500 ml    Exam:  BP 92/66   Pulse 66   Temp 98.7 °F (37.1 °C) (Oral)   Resp 21   Ht 5' 11\" (1.803 m)   Wt 177 lb 0.5 oz (80.3 kg)   SpO2 90%   BMI 24.69 kg/m²     General appearance:   Resting in bed  HEENT:  Normal cephalic, atraumatic without obvious deformity. Pupils equal, round, and reactive to light. Extra ocular muscles intact. Conjunctivae/corneas clear. Neck: Supple, with full range of motion.  No jugular venous distention. Trachea midline. Respiratory:  Normal respiratory effort. Diffuse expiratory wheezing bilaterally. Cardiovascular:  Regular rate and rhythm with normal S1/S2 without murmurs, rubs or gallops. Abdomen: Soft, non-tender, non-distended with normal bowel sounds. Musculoskeletal:  No clubbing, cyanosis or edema bilaterally. Full range of motion without deformity. Skin: Skin color, texture, turgor normal.  No rashes or lesions. Neurologic:  Neurovascularly intact without any focal sensory/motor deficits. Cranial nerves: II-XII intact, grossly non-focal.  Psychiatric:   Somnolent but arousable  Capillary Refill: Brisk,< 3 seconds   Peripheral Pulses: +2 palpable, equal bilaterally       Labs:   Recent Labs     05/06/21  0757 05/07/21  0652 05/08/21  0330   WBC 19.0* 13.4* 9.8   HGB 16.2 14.0 12.2*   HCT 44.9 40.3* 36.6*    204 192     Recent Labs     05/07/21  0853 05/07/21  1439 05/07/21  1439 05/07/21  2257 05/08/21  0330 05/08/21  1054   * 125*   < > 129* 131* 131*   K 4.6 4.2  --   --  3.7  --    CL 94* 90*  --   --  94*  --    CO2 27 24  --   --  21*  --    BUN 12 15  --   --  14  --    CREATININE 0.7 0.7  --   --  0.5  --    CALCIUM 10.4 9.3  --   --  9.0 8.9    < > = values in this interval not displayed. Recent Labs     05/06/21  1528   AST 23   ALT 10*   BILIDIR <0.2   BILITOT 0.4   ALKPHOS 72     No results for input(s): INR in the last 72 hours. No results for input(s): Shruthi Bash in the last 72 hours. Urinalysis:    No results found for: Delarosa Thao, BACTERIA, RBCUA, BLOODU, SPECGRAV, [address]    Radiology:  CT HEAD WO CONTRAST   Final Result   No acute intracranial pathology. **This report has been created using voice recognition software. It may contain minor errors which are inherent in voice recognition technology. **      Final report electronically signed by Dr. Pola Cason on 5/8/2021 11:18 AM      CT Head WO Contrast   Final Result   No acute intracranial process. **This report has been created using voice recognition software. It may contain minor errors which are inherent in voice recognition technology. **      Final report electronically signed by Dr. Alcides Aguilar on 5/6/2021 8:27 AM      XR CHEST PORTABLE   Final Result   1. Normal heart size. No effusion. 2. Minimal atelectasis/pneumonia right cardiophrenic sulcus. **This report has been created using voice recognition software. It may contain minor errors which are inherent in voice recognition technology. **      Final report electronically signed by Dr. Alcides Aguilar on 5/6/2021 8:13 AM      MRI BRAIN W WO CONTRAST    (Results Pending)       Diet: DIET GENERAL; Daily Fluid Restriction: 1500 ml    DVT prophylaxis: [x] Lovenox                                 [] SCDs                                 [] SQ Heparin                                 [] Encourage ambulation           [] Already on Anticoagulation     Disposition:    [x] Home when medically stable        [] TCU       [] Rehab       [] Psych       [] SNF       [] [place]       [] Other-    Code Status: Full Code      Electronically signed by Jorge Menjivar MD on 5/8/2021 at 2:08 PM

## 2021-05-08 NOTE — PROGRESS NOTES
Utilize [facility] alcohol withdrawal scale (Based on [place] Modified Alcohol Withdrawal Scale). Tabulate score based on classifications including Tremor, Sweating, Hallucination, Orientation, and Agitation. Tremor: 1  Sweatin  Hallucinations:   Orientation: 2  Agitation: 1  Total Score: 4    Action perform as described below     Tremor:  No tremor is 0 points. Tremor on movement is 1 point. Tremor at rest is 2 points. Sweating: No Sweat 0 points. Moist is 1 point. Drenching sweats is 2 points. Hallucinations: No present 0 points. Dissuadable is 1 point. Not dissuadable is 2 points. Orientation: Oriented 0 points. Vague/detached 1 point. Disoriented/no contact 2 points. Agitation: Calm 0 points. Anxious 1 point. Panicky 2 points. Check scale every 2 hours. Discontinue scoring with 4 consecutive scorings of 0. Scale 0: No phenobarbital given. Re-assess every 60 minutes as needed. Scale 1-3: Phenobarbital 130 mg IV over 3 minutes. Re-assess every 60 minutes as needed. May administer every 60 minutes to a maximum dose of phenobarbital 1040 mg in 24 hours! Scale 4-8: Phenobarbital 260 mg IV over 5 minutes. Re-assess every 60 minutes as needed. May administer every 60 minutes to a maximum dose of phenobarbital 1040mg in 24 hours! Scale 9-10: Transfer to ICU (if not already in ICU). Administer 10mg/kg phenobarbital IV over 60 minutes. Maximum dose phenobarbital is 1040mg in 24 hours!

## 2021-05-08 NOTE — SIGNIFICANT EVENT
Notified by patient's RN and resource nurse re: new/acute altered mentation    Chart reviewed:    Patient admitted 5/6 AM with severe hyponatremia (Na 118; ) / thought subacute or chronic, secondary to dehydration/outpatient diuretic use / no AMS/seizures / received 2.5L 0.9 NS boluses and started on 0.9 NS at 100 cc/hr. Sodium corrected 16 points (134) in the first 24 hours (target correction 4-6 mEq, no greater than 8). 0.9 NS was substituted for 0.45 NS at the same rate 5/7 AM, with subsequent substitution for 0.9 NS at 75 cc/hr 5/7 at 1700. Current Na 129 (+11 points over approximately 48 hours/since admission)    Plan:  - IVF discontinued / repeat sodium level in 4 hours  - Nephrology consulted  - CT head this AM  - UDS (patient reported to be smoking cigarettes in the bathroom)  - Family reported to RN that patient likely drinks 3-4 alcoholic beverages per day. PAWSS / phenobarbital protocol initiated    Routine neurologic checks to monitor for clinical improvement.     Electronically signed by Jess Espinosa MD on 5/8/2021 at 3:31 AM

## 2021-05-08 NOTE — PROGRESS NOTES
0200 Patient was assessed by this RN after finding patient off heart monitor. Heart monitor was on floor. Patient disoriented believing he was at a house with neighbors who are mowing lawn, not able to reorient patient. Spoke with [employer] and called patient son about possible ETOH use. Son, Radha Richardson stated patient usually drinks at least 3-4 Cranberry and Vodkas per day. Dr. Brittney Jose made aware of the situation and ordered pheno debora. Patient was given pheno debora with minimal response, tele sitter placed at bedside. Patient went to nurses station demanding a cigarette and became verbally aggressive with other nurses. [address] called and this RN escorted patient back to bed. Patient was refusing additional pheno debora so Dr. Brittney Jose notified and resident Dr. Mary Beth Morales sent to bedside. Patient attempting to call all phone contacts to get a ride home and was not even able to tell him where he was or what was going on. He was adament about leaving and received second dose of pheno debora but due to the nature of his condition and lack of coherence the decision was made to [facility] the patient per Dr. Mary Beth Morales and Dr. Brittney Jose. This RN, [address] with resource transferred the patient via wheelchair to ICU 4D07 to initiate a precedex gtt. Son Radha Richardson and Daughter in law Shefali García aware of situation throughout evening and notified of transfer to ICU.

## 2021-05-08 NOTE — PROGRESS NOTES
Patient very agitated. Jumped out of bed and states, \"I'm done playing. \"  Writer attempted to calm patient down and reorient patient. Patient insists that he's part of a game. Ripped off monitoring equipment and attempted to rip out IV multiple times; writer able to prevent IV from being pulled out. Asked sitter to call security. Dr. Vanesa Torrez in room for consult. Patient wanting to sit in chair; ambulated to chair with unsteady gait. Dr. Vanesa Torrez and writer attempted multiple times to reorient and redirect patient. Patient calm down and keeps apologizing; saying, \"I thought I was in a game like in high school. \" Patient continues to be confused saying he at a mental hospital and asking how long he will be going to prison for.

## 2021-05-08 NOTE — CONSULTS
hyponatremia with initial serum sodium of 118 mEq/L. Admitted for evaluation and management. In 24-hour timeframe the serum sodium increased from 118 mEq to 130 mEq. As a result I was asked to see him. He was transferred to critical block for sedation. He has been agitated and somewhat confused not knowing why he is here and how he came. Past Medical History:   Diagnosis Date    Diabetes mellitus ([address]     GERD (gastroesophageal reflux disease)     Hyperlipidemia     Hypertension     Seizures (HCC)     Sleep apnea     does not use cpap       Past Surgical History:   Procedure Laterality Date    COLONOSCOPY         Family History   Problem Relation Age of Onset    High Blood Pressure Father     High Cholesterol Father         reports that he has been smoking cigarettes. He has a 20.00 pack-year smoking history. He has never used smokeless tobacco. He reports current alcohol use. He reports that he does not use drugs. Allergies:  Patient has no known allergies. Current Medications:     PHENobarbital (LUMINAL) injection 130 mg, Q1H PRN    Or  PHENobarbital (LUMINAL) injection 260 mg, Q1H PRN    Or  PHENobarbital (LUMINAL) 752.7 mg in sodium chloride 0.9 % 100 mL IVPB, Daily PRN  dexmedetomidine (PRECEDEX) 400 mcg in sodium chloride 0.9 % 100 mL infusion, Continuous  metoprolol tartrate (LOPRESSOR) tablet 25 mg, BID  cefTRIAXone (ROCEPHIN) 1000 mg IVPB in 50 mL D5W minibag, Q24H  pravastatin (PRAVACHOL) tablet 40 mg, Nightly  sodium chloride flush 0.9 % injection 5-40 mL, 2 times per day  sodium chloride flush 0.9 % injection 5-40 mL, PRN  0.9 % sodium chloride infusion, PRN  enoxaparin (LOVENOX) injection 40 mg, Q24H  promethazine (PHENERGAN) tablet 12.5 mg, Q6H PRN    Or  ondansetron (ZOFRAN) injection 4 mg, Q6H PRN  polyethylene glycol (GLYCOLAX) packet 17 g, Daily PRN  acetaminophen (TYLENOL) tablet 650 mg, Q6H PRN    Or  acetaminophen (TYLENOL) suppository 650 mg, Q6H PRN  famotidine (PEPCID) tablet 20 mg, BID  glucose (GLUTOSE) 40 % oral gel 15 g, PRN  dextrose 50 % IV solution, PRN  glucagon (rDNA) injection 1 mg, PRN  dextrose 5 % solution, PRN  azithromycin (ZITHROMAX) 500 mg in D5W 250ml addavial, Q24H        Review of Systems:   Pertinent positives stated above in HPI. All other systems were reviewed and were negative. ROS: As in HPI. Physical exam:   Constitutional: Well-developed middle-aged gentleman in no distress. Vitals:   Vitals:    05/08/21 1155   BP: 110/68   Pulse: 66   Resp: 18   Temp:    SpO2: 94%     }  Skin: no rash, turgor is normal  Heent: Pupils are reactive to light. Throat is clear. Oral mucosa is moist.  Neck: no bruits or jvd noted   Cardiovascular:  S1, S2 without murmur   Respiratory: Decreased breath sound bilaterally  Abdomen: soft,non tender,good bowel sound and no palpable mass  Ext: No lower extremity edema  Psychiatric: Agitated  Musculoskeletal:  Rom, muscular strength intact  CNS: Very awake and very alert. Speech is normal.  Obeys command. No focal deficit.     Data:   Labs:  Lab Results   Component Value Date     (L) 05/08/2021     (L) 05/08/2021     (L) 05/07/2021    K 3.7 05/08/2021    K 4.2 05/07/2021    K 4.6 05/07/2021    CL 94 (L) 05/08/2021    CO2 21 (L) 05/08/2021    CO2 24 05/07/2021    CO2 27 05/07/2021    CREATININE 0.5 05/08/2021    CREATININE 0.7 05/07/2021    CREATININE 0.7 05/07/2021    BUN 14 05/08/2021    BUN 15 05/07/2021    BUN 12 05/07/2021    GLUCOSE 82 05/08/2021    GLUCOSE 105 05/07/2021    GLUCOSE 103 05/07/2021    WBC 9.8 05/08/2021    WBC 13.4 (H) 05/07/2021    WBC 19.0 (H) 05/06/2021    HGB 12.2 (L) 05/08/2021    HGB 14.0 05/07/2021    HGB 16.2 05/06/2021    HCT 36.6 (L) 05/08/2021    HCT 40.3 (L) 05/07/2021    HCT 44.9 05/06/2021    MCV 96.8 (H) 05/08/2021     05/08/2021     {Labs reviewed    Imaging:  CXR results: Diagnostic images reports reviewed        Thank you Dr. Joycelyn Apley, MD for allowing us to participate in care of Brody Simpson   **This report has been created using voice recognition software. It maycontain minor  errors which are inherent in voice recognition technology. **    Electronically signed by Dorothy Kelly MD on 5/8/2021 at 11:50 AM

## 2021-05-08 NOTE — SIGNIFICANT EVENT
Patient became agitated and confused at 0200 on 5/8/21. Notified by Nursing staff and Dr Disha Martinez ordered phenobarb for possible alcohol withdrawal. However, despite two rounds of phenobarb patient did not improve. He continued to be disoriented. Continuously unable to answer place/time and nonsensical language. He repetitively was calling his friends and sons asking to come pick him up from the school. Patient willingly went over to the ICU for precedex treatment although he had some hesitation and did not have a good understanding of medically his situation and treatment needed.      Unclear at this point if secondary to overcorrection of sodium versus alcohol withdrawal.

## 2021-05-09 LAB
ANION GAP SERPL CALCULATED.3IONS-SCNC: 9 MEQ/L (ref 8–16)
BASOPHILS # BLD: 0.9 %
BASOPHILS ABSOLUTE: 0.1 THOU/MM3 (ref 0–0.1)
BUN BLDV-MCNC: 10 MG/DL (ref 7–22)
CALCIUM SERPL-MCNC: 9.4 MG/DL (ref 8.5–10.5)
CHLORIDE BLD-SCNC: 101 MEQ/L (ref 98–111)
CO2: 26 MEQ/L (ref 23–33)
CREAT SERPL-MCNC: 0.6 MG/DL (ref 0.4–1.2)
EOSINOPHIL # BLD: 1.2 %
EOSINOPHILS ABSOLUTE: 0.1 THOU/MM3 (ref 0–0.4)
ERYTHROCYTE [DISTWIDTH] IN BLOOD BY AUTOMATED COUNT: 12.9 % (ref 11.5–14.5)
ERYTHROCYTE [DISTWIDTH] IN BLOOD BY AUTOMATED COUNT: 44.5 FL (ref 35–45)
GFR SERPL CREATININE-BSD FRML MDRD: > 90 ML/MIN/1.73M2
GLUCOSE BLD-MCNC: 136 MG/DL (ref 70–108)
HCT VFR BLD CALC: 36.6 % (ref 42–52)
HEMOGLOBIN: 12.3 GM/DL (ref 14–18)
IMMATURE GRANS (ABS): 0.06 THOU/MM3 (ref 0–0.07)
IMMATURE GRANULOCYTES: 0.9 %
IMMUNOFIXATION URINE: NORMAL
LYMPHOCYTES # BLD: 12.4 %
LYMPHOCYTES ABSOLUTE: 0.9 THOU/MM3 (ref 1–4.8)
MCH RBC QN AUTO: 32 PG (ref 26–33)
MCHC RBC AUTO-ENTMCNC: 33.6 GM/DL (ref 32.2–35.5)
MCV RBC AUTO: 95.3 FL (ref 80–94)
MONOCYTES # BLD: 16 %
MONOCYTES ABSOLUTE: 1.1 THOU/MM3 (ref 0.4–1.3)
NUCLEATED RED BLOOD CELLS: 0 /100 WBC
PLATELET # BLD: 210 THOU/MM3 (ref 130–400)
PMV BLD AUTO: 9.1 FL (ref 9.4–12.4)
POTASSIUM SERPL-SCNC: 4 MEQ/L (ref 3.5–5.2)
RBC # BLD: 3.84 MILL/MM3 (ref 4.7–6.1)
SEG NEUTROPHILS: 68.6 %
SEGMENTED NEUTROPHILS ABSOLUTE COUNT: 4.7 THOU/MM3 (ref 1.8–7.7)
SODIUM BLD-SCNC: 136 MEQ/L (ref 135–145)
WBC # BLD: 6.9 THOU/MM3 (ref 4.8–10.8)

## 2021-05-09 PROCEDURE — 2580000003 HC RX 258: Performed by: INTERNAL MEDICINE

## 2021-05-09 PROCEDURE — 2580000003 HC RX 258: Performed by: STUDENT IN AN ORGANIZED HEALTH CARE EDUCATION/TRAINING PROGRAM

## 2021-05-09 PROCEDURE — 36415 COLL VENOUS BLD VENIPUNCTURE: CPT

## 2021-05-09 PROCEDURE — 2580000003 HC RX 258: Performed by: FAMILY MEDICINE

## 2021-05-09 PROCEDURE — 6360000002 HC RX W HCPCS: Performed by: STUDENT IN AN ORGANIZED HEALTH CARE EDUCATION/TRAINING PROGRAM

## 2021-05-09 PROCEDURE — 2060000000 HC ICU INTERMEDIATE R&B

## 2021-05-09 PROCEDURE — 99232 SBSQ HOSP IP/OBS MODERATE 35: CPT | Performed by: SURGERY

## 2021-05-09 PROCEDURE — 1200000003 HC TELEMETRY R&B

## 2021-05-09 PROCEDURE — 6370000000 HC RX 637 (ALT 250 FOR IP): Performed by: NURSE PRACTITIONER

## 2021-05-09 PROCEDURE — 99232 SBSQ HOSP IP/OBS MODERATE 35: CPT | Performed by: INTERNAL MEDICINE

## 2021-05-09 PROCEDURE — 80048 BASIC METABOLIC PNL TOTAL CA: CPT

## 2021-05-09 PROCEDURE — 94640 AIRWAY INHALATION TREATMENT: CPT

## 2021-05-09 PROCEDURE — 2500000003 HC RX 250 WO HCPCS: Performed by: FAMILY MEDICINE

## 2021-05-09 PROCEDURE — 6370000000 HC RX 637 (ALT 250 FOR IP): Performed by: STUDENT IN AN ORGANIZED HEALTH CARE EDUCATION/TRAINING PROGRAM

## 2021-05-09 PROCEDURE — 6370000000 HC RX 637 (ALT 250 FOR IP): Performed by: INTERNAL MEDICINE

## 2021-05-09 PROCEDURE — 94669 MECHANICAL CHEST WALL OSCILL: CPT

## 2021-05-09 PROCEDURE — 85025 COMPLETE CBC W/AUTO DIFF WBC: CPT

## 2021-05-09 PROCEDURE — 94760 N-INVAS EAR/PLS OXIMETRY 1: CPT

## 2021-05-09 RX ORDER — CETIRIZINE HYDROCHLORIDE 10 MG/1
10 TABLET ORAL DAILY
Status: DISCONTINUED | OUTPATIENT
Start: 2021-05-09 | End: 2021-05-11 | Stop reason: HOSPADM

## 2021-05-09 RX ORDER — IPRATROPIUM BROMIDE AND ALBUTEROL SULFATE 2.5; .5 MG/3ML; MG/3ML
1 SOLUTION RESPIRATORY (INHALATION)
Status: DISCONTINUED | OUTPATIENT
Start: 2021-05-09 | End: 2021-05-11 | Stop reason: HOSPADM

## 2021-05-09 RX ORDER — LORATADINE 10 MG/1
10 TABLET ORAL DAILY
Status: DISCONTINUED | OUTPATIENT
Start: 2021-05-09 | End: 2021-05-09 | Stop reason: CLARIF

## 2021-05-09 RX ADMIN — IPRATROPIUM BROMIDE AND ALBUTEROL SULFATE 1 AMPULE: .5; 3 SOLUTION RESPIRATORY (INHALATION) at 13:40

## 2021-05-09 RX ADMIN — AZITHROMYCIN DIHYDRATE 500 MG: 500 INJECTION, POWDER, LYOPHILIZED, FOR SOLUTION INTRAVENOUS at 08:45

## 2021-05-09 RX ADMIN — ENOXAPARIN SODIUM 40 MG: 40 INJECTION SUBCUTANEOUS at 17:25

## 2021-05-09 RX ADMIN — FAMOTIDINE 20 MG: 20 TABLET ORAL at 08:06

## 2021-05-09 RX ADMIN — IPRATROPIUM BROMIDE AND ALBUTEROL SULFATE 1 AMPULE: .5; 3 SOLUTION RESPIRATORY (INHALATION) at 10:01

## 2021-05-09 RX ADMIN — Medication 100 MG: at 08:05

## 2021-05-09 RX ADMIN — SODIUM CHLORIDE, PRESERVATIVE FREE 10 ML: 5 INJECTION INTRAVENOUS at 21:17

## 2021-05-09 RX ADMIN — FAMOTIDINE 20 MG: 20 TABLET ORAL at 21:16

## 2021-05-09 RX ADMIN — METOPROLOL TARTRATE 25 MG: 25 TABLET, FILM COATED ORAL at 08:05

## 2021-05-09 RX ADMIN — SODIUM CHLORIDE 0.8 MCG/KG/HR: 9 INJECTION, SOLUTION INTRAVENOUS at 00:56

## 2021-05-09 RX ADMIN — Medication 1 TABLET: at 08:05

## 2021-05-09 RX ADMIN — CETIRIZINE HYDROCHLORIDE 10 MG: 10 TABLET, FILM COATED ORAL at 22:43

## 2021-05-09 RX ADMIN — IPRATROPIUM BROMIDE AND ALBUTEROL SULFATE 1 AMPULE: .5; 3 SOLUTION RESPIRATORY (INHALATION) at 17:00

## 2021-05-09 RX ADMIN — FOLIC ACID 1 MG: 1 TABLET ORAL at 08:05

## 2021-05-09 RX ADMIN — METOPROLOL TARTRATE 25 MG: 25 TABLET, FILM COATED ORAL at 21:16

## 2021-05-09 RX ADMIN — CEFTRIAXONE SODIUM 1000 MG: 1 INJECTION, POWDER, FOR SOLUTION INTRAMUSCULAR; INTRAVENOUS at 08:06

## 2021-05-09 RX ADMIN — PRAVASTATIN SODIUM 40 MG: 40 TABLET ORAL at 21:16

## 2021-05-09 RX ADMIN — DEXTROSE MONOHYDRATE: 50 INJECTION, SOLUTION INTRAVENOUS at 08:13

## 2021-05-09 ASSESSMENT — PAIN SCALES - GENERAL: PAINLEVEL_OUTOF10: 0

## 2021-05-09 NOTE — PROGRESS NOTES
Renal Progress Note    Assessment and Plan:    1. Hyponatremia with overcorrection resolving  2. Agitation resolved  3. Mental confusion resolved  4. Macrocytic anemia  5.   Pneumonia  PLAN:   Labs reviewed  Serum sodium is normal  MRI of the brain report reviewed  It is unremarkable  Medications reviewed  Continue D5W infusion  Discontinue every 4 hours serum sodium check  Labs in the morning  Discussed with staff  Discussed with patient's son in the room  Will follow      Patient Active Problem List:     Hyponatremia     Sepsis ([facility] [address]      Subjective:   Admit Date: 5/6/2021    Interval History:   Seen for hyponatremia  Comfortably asleep this morning  Family in the room  No concern from family  Updated by the staff  No issues  Good blood pressure  Good urine output    Medications:   Scheduled Meds:   ipratropium-albuterol  1 ampule Inhalation P6A WA    folic acid  1 mg Oral Daily    thiamine  100 mg Oral Daily    multivitamin  1 tablet Oral Daily    metoprolol tartrate  25 mg Oral BID    cefTRIAXone (ROCEPHIN) IV  1,000 mg Intravenous Q24H    pravastatin  40 mg Oral Nightly    sodium chloride flush  5-40 mL Intravenous 2 times per day    enoxaparin  40 mg Subcutaneous Q24H    famotidine  20 mg Oral BID    azithromycin  500 mg Intravenous Q24H     Continuous Infusions:   dexmedetomidine 0.3 mcg/kg/hr (05/09/21 0849)    dextrose 100 mL/hr at 05/09/21 0813    sodium chloride      dextrose         CBC:   Recent Labs     05/07/21  0652 05/08/21  0330 05/09/21  0529   WBC 13.4* 9.8 6.9   HGB 14.0 12.2* 12.3*    192 210     CMP:    Recent Labs     05/07/21  1439 05/07/21  1439 05/08/21  0330 05/08/21  0330 05/08/21  1847 05/08/21  2326 05/09/21  0442   *   < > 131*   < > 133* 135 136   K 4.2  --  3.7  --   --   --  4.0   CL 90*  --  94*  --   --   --  101   CO2 24  --  21*  --   --   --  26   BUN 15  --  14  --   --   --  10   CREATININE 0.7  --  0.5  --   --   --  0.6   GLUCOSE 105  --  82 --   --   --  136*   CALCIUM 9.3  --  9.0   < > 9.0 9.2 9.4   LABGLOM >90  --  >90  --   --   --  >90    < > = values in this interval not displayed. Troponin: No results for input(s): TROPONINI in the last 72 hours. BNP: No results for input(s): BNP in the last 72 hours. INR: No results for input(s): INR in the last 72 hours. Lipids:   Recent Labs     05/06/21  1528   CHOL 130   TRIG 111   HDL 41     Liver:   Recent Labs     05/06/21  1528   AST 23   ALT 10*   ALKPHOS 72   PROT 6.3   LABALBU 3.8   BILITOT 0.4     Iron:  No results for input(s): IRONS, FERRITIN in the last 72 hours. Invalid input(s): LABIRONS  MRI BRAIN W WO CONTRAST   Final Result       1. No evidence of acute intracranial abnormality. No MR evidence of central pontine myelinolysis. 2. Mild scattered focal areas of T2/FLAIR hyperintensity in the supratentorial matter are nonspecific but most likely represent sequelae of chronic microvascular angiopathy. **This report has been created using voice recognition software. It may contain minor errors which are inherent in voice recognition technology. **         Final report electronically signed by Dr. Ricardo Ramirez MD on 5/8/2021 4:59 PM      CT HEAD WO CONTRAST   Final Result   No acute intracranial pathology. **This report has been created using voice recognition software. It may contain minor errors which are inherent in voice recognition technology. **      Final report electronically signed by Dr. Mihir Stewart on 5/8/2021 11:18 AM      CT Head WO Contrast   Final Result   No acute intracranial process. **This report has been created using voice recognition software. It may contain minor errors which are inherent in voice recognition technology. **      Final report electronically signed by Dr. Nini Mar on 5/6/2021 8:27 AM      XR CHEST PORTABLE   Final Result   1. Normal heart size. No effusion.    2. Minimal atelectasis/pneumonia right

## 2021-05-09 NOTE — PLAN OF CARE
Problem: Falls - Risk of:  Goal: Will remain free from falls  Description: Will remain free from falls  Outcome: Ongoing  Note: Remains free of falls at this time. Had fall just prior to admission. Was sedated and had unsteady gait yesterday; sedation off and ambulates with steady gait today. Alert and oriented and using call light appropriately. Nonskid socks on at all times. Bed alarm in use. Call light in reach. Goal: Absence of physical injury  Description: Absence of physical injury  Outcome: Ongoing     Problem: Pain:  Description: Pain management should include both nonpharmacologic and pharmacologic interventions. Goal: Pain level will decrease  Description: Pain level will decrease  Outcome: Ongoing  Note: No c/o pain. No interventions needed at this time. Continue to monitor. Goal: Control of acute pain  Description: Control of acute pain  Outcome: Ongoing  Goal: Control of chronic pain  Description: Control of chronic pain  Outcome: Ongoing     Problem: Skin Integrity:  Goal: Will show no infection signs and symptoms  Description: Will show no infection signs and symptoms  Outcome: Ongoing  Goal: Absence of new skin breakdown  Description: Absence of new skin breakdown  Outcome: Ongoing  Note: No new issues noted. Continue to monitor. Care plan reviewed with patient and input provided by patient.

## 2021-05-09 NOTE — PROGRESS NOTES
CRITICAL CARE PROGRESS NOTE      Patient:  Mary Welsh    Unit/Bed:4D-07/007-A  YOB: 1965  MRN: [id number]   PCP: Astrid Dewitt MD  Date of Admission: 5/6/2021  Chief Complaint:-Shortness of breath, chest pain and lightheadedness    Assessment and Plan:      Community Acquired Pneumonia: CXR demonstrates right cardiophrenic pneumonia versus atelectasis. Patient symptomatic with productive cough and yellow sputum. Patient is afebrile however does have leukocytosis. Breath sounds improving   -Continue ceftriaxone   -DuoNebs every 4 hours   -Pulmonary hygiene     Subacute on chronic severe hypotonic hypovolemic hyponatremia: Likely due to dehydration with diuretic use, pneumonia and and possible beer protamine. Patient's initial sodium was 118 patient received 2.5 L normal saline bolus in the emergency room department and her corrected sodium to 134. Fluids were then adjusted to hypotonic solution. MRI was for demyelination.     -Nephrology following   -Continue fluid restriction    Acute alcohol withdrawl: History of significant alcohol use. Patient did demonstrate delirious features. Patient was given a trial of phenobarbital without improvement.    -Continue to decrease Precedex   -Daily multivitamin, folate, thiamine     Primary hypertension:  Controlled at baseline however elevated this morning. Will consider increasing Lopressor.    -Hold home Hyzar due to hyponatremia and hypercalcemia     -Continue Lopressor BID   -Continue to monitor    IDDM2: Controlled. HgA1c 5.7 5/6/21. Goal blood glucose 140 -180   -Hold home meds   -Hypoglycemic protocol   -Patient not requiring insulin sliding scale this time. Hyperlipidemia: Resume home statin    Hypercalcemia-resolved: PTH independent. Initial calcium 12.8, current calcium 9.4. PTH was intact and suppressed.   Normal alk phos and vitamin D.  TSH mildly elevated 1.43   -Hold hydrochlorothiazide    Lactic acidosis-resolved: Initial beverages per day, denies illicit drugs. ROS   Patient admit to continued dizziness but improved   Patient denies shortness of breath, no chest pain, abdominal pain, nausea, vomiting    Scheduled Meds:   nicotine  1 patch Transdermal Daily    folic acid  1 mg Oral Daily    thiamine  100 mg Oral Daily    multivitamin  1 tablet Oral Daily    metoprolol tartrate  25 mg Oral BID    cefTRIAXone (ROCEPHIN) IV  1,000 mg Intravenous Q24H    pravastatin  40 mg Oral Nightly    sodium chloride flush  5-40 mL Intravenous 2 times per day    enoxaparin  40 mg Subcutaneous Q24H    famotidine  20 mg Oral BID    azithromycin  500 mg Intravenous Q24H     Continuous Infusions:   dexmedetomidine 0.7 mcg/kg/hr (05/09/21 0445)    dextrose 100 mL/hr at 05/08/21 2051    sodium chloride      dextrose         PHYSICAL EXAMINATION:  T: 97.9.  P: 60. RR: 21. B/P: 118/69. O2 Sat: 85.  I/O:  1321 mL/ 1825 mL   Body mass index is 24.69 kg/m². GCS:   14  General:     HEENT:  normocephalic and right-sided head laceration with staples. No scleral icterus. PERR  Neck: supple. No Thyromegaly. Lungs: Diminished breath sounds bilaterally with minimal wheezing noted. Productive cough   Cardiac: RRR. No JVD. Abdomen: soft. Nontender. Extremities:  No clubbing, cyanosis, or edema x 4. Vasculature: capillary refill < 3 seconds. Palpable dorsalis pedis pulses. Skin:  warm and dry. Psych:  Alert and oriented x3. Affect appropriate  Lymph:  No supraclavicular adenopathy. Neurologic:  No focal deficit. No seizures. Data: (All radiographs, tracings, PFTs, and imaging are personally viewed and interpreted unless otherwise noted).  Sodium 136, potassium 4.0, chloride 101, CO2 26, BUN 10, creatinine 0.6, calcium 9.4   WBC 6.9, hemoglobin 12.3, platelets 460   Telemetry shows normal sinus rhythm   MRI brain 5/8/2021 reports no evidence of central pontine myelinolysis.  Mild scattered focal areas of T2/ FLAIR hyperintensity in supratentorial matter likely sequelae of chronic microvascular angiopathy      Seen with multidisciplinary ICU team.  Meets Continued ICU Level Care Criteria:    [x] Yes   [] No - Transfer Planned to listed location:  [] HOSPITALIST CONTACTED-      Case and plan discussed with Dr. Clent Mcburney. Electronically signed by Alyssa Joy DO  CRITICAL CARE SPECIALIST  I have independently performed an evaluation on Eros King . I have reviewed the above documentation completed by the resident physician. Please see my additional contributions to the HPI, physical exam, assessment/medical decision making. Precidex weaning, after weaned and off will be stable for transfer to step down. Na wnl.  Ceftriaxone emperically, no growth     Electronically signed by Jessenia Gerard MD on 5/9/2021 at 9:16 AM

## 2021-05-10 LAB
ANION GAP SERPL CALCULATED.3IONS-SCNC: 9 MEQ/L (ref 8–16)
BASOPHILS # BLD: 0.6 %
BASOPHILS ABSOLUTE: 0 THOU/MM3 (ref 0–0.1)
BUN BLDV-MCNC: 7 MG/DL (ref 7–22)
CALCIUM SERPL-MCNC: 9 MG/DL (ref 8.5–10.5)
CHLORIDE BLD-SCNC: 98 MEQ/L (ref 98–111)
CO2: 26 MEQ/L (ref 23–33)
CREAT SERPL-MCNC: 0.7 MG/DL (ref 0.4–1.2)
EOSINOPHIL # BLD: 1.2 %
EOSINOPHILS ABSOLUTE: 0.1 THOU/MM3 (ref 0–0.4)
ERYTHROCYTE [DISTWIDTH] IN BLOOD BY AUTOMATED COUNT: 12.9 % (ref 11.5–14.5)
ERYTHROCYTE [DISTWIDTH] IN BLOOD BY AUTOMATED COUNT: 45.1 FL (ref 35–45)
GFR SERPL CREATININE-BSD FRML MDRD: > 90 ML/MIN/1.73M2
GLUCOSE BLD-MCNC: 116 MG/DL (ref 70–108)
HCT VFR BLD CALC: 36 % (ref 42–52)
HEMOGLOBIN: 12.1 GM/DL (ref 14–18)
IMMATURE GRANS (ABS): 0.08 THOU/MM3 (ref 0–0.07)
IMMATURE GRANULOCYTES: 1 %
LYMPHOCYTES # BLD: 11.6 %
LYMPHOCYTES ABSOLUTE: 0.9 THOU/MM3 (ref 1–4.8)
MCH RBC QN AUTO: 32.1 PG (ref 26–33)
MCHC RBC AUTO-ENTMCNC: 33.6 GM/DL (ref 32.2–35.5)
MCV RBC AUTO: 95.5 FL (ref 80–94)
MONOCYTES # BLD: 17.8 %
MONOCYTES ABSOLUTE: 1.4 THOU/MM3 (ref 0.4–1.3)
NUCLEATED RED BLOOD CELLS: 0 /100 WBC
PLATELET # BLD: 279 THOU/MM3 (ref 130–400)
PMV BLD AUTO: 8.8 FL (ref 9.4–12.4)
POTASSIUM SERPL-SCNC: 3.6 MEQ/L (ref 3.5–5.2)
RBC # BLD: 3.77 MILL/MM3 (ref 4.7–6.1)
SEG NEUTROPHILS: 67.8 %
SEGMENTED NEUTROPHILS ABSOLUTE COUNT: 5.5 THOU/MM3 (ref 1.8–7.7)
SODIUM BLD-SCNC: 133 MEQ/L (ref 135–145)
WBC # BLD: 8.1 THOU/MM3 (ref 4.8–10.8)

## 2021-05-10 PROCEDURE — 80048 BASIC METABOLIC PNL TOTAL CA: CPT

## 2021-05-10 PROCEDURE — 1200000003 HC TELEMETRY R&B

## 2021-05-10 PROCEDURE — 6360000002 HC RX W HCPCS: Performed by: STUDENT IN AN ORGANIZED HEALTH CARE EDUCATION/TRAINING PROGRAM

## 2021-05-10 PROCEDURE — 36415 COLL VENOUS BLD VENIPUNCTURE: CPT

## 2021-05-10 PROCEDURE — 6370000000 HC RX 637 (ALT 250 FOR IP): Performed by: STUDENT IN AN ORGANIZED HEALTH CARE EDUCATION/TRAINING PROGRAM

## 2021-05-10 PROCEDURE — 2580000003 HC RX 258: Performed by: INTERNAL MEDICINE

## 2021-05-10 PROCEDURE — 2580000003 HC RX 258: Performed by: STUDENT IN AN ORGANIZED HEALTH CARE EDUCATION/TRAINING PROGRAM

## 2021-05-10 PROCEDURE — 99233 SBSQ HOSP IP/OBS HIGH 50: CPT | Performed by: INTERNAL MEDICINE

## 2021-05-10 PROCEDURE — 6370000000 HC RX 637 (ALT 250 FOR IP): Performed by: INTERNAL MEDICINE

## 2021-05-10 PROCEDURE — 6370000000 HC RX 637 (ALT 250 FOR IP): Performed by: NURSE PRACTITIONER

## 2021-05-10 PROCEDURE — 99232 SBSQ HOSP IP/OBS MODERATE 35: CPT | Performed by: INTERNAL MEDICINE

## 2021-05-10 PROCEDURE — 94640 AIRWAY INHALATION TREATMENT: CPT

## 2021-05-10 PROCEDURE — 85025 COMPLETE CBC W/AUTO DIFF WBC: CPT

## 2021-05-10 PROCEDURE — 94760 N-INVAS EAR/PLS OXIMETRY 1: CPT

## 2021-05-10 RX ADMIN — CETIRIZINE HYDROCHLORIDE 10 MG: 10 TABLET, FILM COATED ORAL at 08:06

## 2021-05-10 RX ADMIN — PRAVASTATIN SODIUM 40 MG: 40 TABLET ORAL at 23:14

## 2021-05-10 RX ADMIN — DEXTROSE MONOHYDRATE: 50 INJECTION, SOLUTION INTRAVENOUS at 04:36

## 2021-05-10 RX ADMIN — Medication 100 MG: at 08:05

## 2021-05-10 RX ADMIN — FAMOTIDINE 20 MG: 20 TABLET ORAL at 08:05

## 2021-05-10 RX ADMIN — CEFTRIAXONE SODIUM 1000 MG: 1 INJECTION, POWDER, FOR SOLUTION INTRAMUSCULAR; INTRAVENOUS at 08:02

## 2021-05-10 RX ADMIN — METOPROLOL TARTRATE 25 MG: 25 TABLET, FILM COATED ORAL at 08:06

## 2021-05-10 RX ADMIN — METOPROLOL TARTRATE 25 MG: 25 TABLET, FILM COATED ORAL at 23:14

## 2021-05-10 RX ADMIN — SODIUM CHLORIDE, PRESERVATIVE FREE 10 ML: 5 INJECTION INTRAVENOUS at 08:06

## 2021-05-10 RX ADMIN — LIDOCAINE HYDROCHLORIDE: 20 SOLUTION ORAL; TOPICAL at 11:59

## 2021-05-10 RX ADMIN — ENOXAPARIN SODIUM 40 MG: 40 INJECTION SUBCUTANEOUS at 17:01

## 2021-05-10 RX ADMIN — FAMOTIDINE 20 MG: 20 TABLET ORAL at 23:14

## 2021-05-10 RX ADMIN — SODIUM CHLORIDE, PRESERVATIVE FREE 10 ML: 5 INJECTION INTRAVENOUS at 23:14

## 2021-05-10 RX ADMIN — FOLIC ACID 1 MG: 1 TABLET ORAL at 08:05

## 2021-05-10 RX ADMIN — IPRATROPIUM BROMIDE AND ALBUTEROL SULFATE 1 AMPULE: .5; 3 SOLUTION RESPIRATORY (INHALATION) at 08:27

## 2021-05-10 RX ADMIN — IPRATROPIUM BROMIDE AND ALBUTEROL SULFATE 1 AMPULE: .5; 3 SOLUTION RESPIRATORY (INHALATION) at 16:26

## 2021-05-10 RX ADMIN — IPRATROPIUM BROMIDE AND ALBUTEROL SULFATE 1 AMPULE: .5; 3 SOLUTION RESPIRATORY (INHALATION) at 12:00

## 2021-05-10 RX ADMIN — IPRATROPIUM BROMIDE AND ALBUTEROL SULFATE 1 AMPULE: .5; 3 SOLUTION RESPIRATORY (INHALATION) at 20:15

## 2021-05-10 RX ADMIN — Medication 1 TABLET: at 08:05

## 2021-05-10 RX ADMIN — AZITHROMYCIN DIHYDRATE 500 MG: 500 INJECTION, POWDER, LYOPHILIZED, FOR SOLUTION INTRAVENOUS at 10:28

## 2021-05-10 NOTE — PROGRESS NOTES
Renal Progress Note    Assessment and Plan:    1. Hyponatremia much improved  2. Microcytic anemia  3. Pneumonia  4. Hypertension  PLAN:  Lab result discussed with the patient. He understood. Medications reviewed. Monitor blood pressure closely and if it remains high will increase metoprolol from 25 mg a day to 50 mg a day. Discontinue dextrose infusion. Labs in the morning. We will follow. Patient Active Problem List:     Hyponatremia     Sepsis ([address]      Subjective:   Admit Date: 5/6/2021    Interval History:   Seen for hyponatremia  Awake and alert this morning  Feels good  No complaint  Updated by the staff  No issues  Blood pressure is high but have been mostly normal otherwise  Good urine output      Medications:   Scheduled Meds:   GI cocktail   Oral Once    ipratropium-albuterol  1 ampule Inhalation Q4H WA    cetirizine  10 mg Oral Daily    folic acid  1 mg Oral Daily    thiamine  100 mg Oral Daily    multivitamin  1 tablet Oral Daily    metoprolol tartrate  25 mg Oral BID    cefTRIAXone (ROCEPHIN) IV  1,000 mg Intravenous Q24H    pravastatin  40 mg Oral Nightly    sodium chloride flush  5-40 mL Intravenous 2 times per day    enoxaparin  40 mg Subcutaneous Q24H    famotidine  20 mg Oral BID    azithromycin  500 mg Intravenous Q24H     Continuous Infusions:   sodium chloride      dextrose         CBC:   Recent Labs     05/08/21  0330 05/09/21  0529 05/10/21  0421   WBC 9.8 6.9 8.1   HGB 12.2* 12.3* 12.1*    210 279     CMP:    Recent Labs     05/08/21  0330 05/08/21  0330 05/08/21  2326 05/09/21  0442 05/10/21  0421   *   < > 135 136 133*   K 3.7  --   --  4.0 3.6   CL 94*  --   --  101 98   CO2 21*  --   --  26 26   BUN 14  --   --  10 7   CREATININE 0.5  --   --  0.6 0.7   GLUCOSE 82  --   --  136* 116*   CALCIUM 9.0   < > 9.2 9.4 9.0   LABGLOM >90  --   --  >90 >90    < > = values in this interval not displayed.      Troponin: No results for input(s): TROPONINI in the last 72 hours. BNP: No results for input(s): BNP in the last 72 hours. INR: No results for input(s): INR in the last 72 hours. Lipids: No results for input(s): CHOL, LDLDIRECT, TRIG, HDL, AMYLASE, LIPASE in the last 72 hours. Liver: No results for input(s): AST, ALT, ALKPHOS, PROT, LABALBU, BILITOT in the last 72 hours. Invalid input(s): BILDIR  Iron:  No results for input(s): IRONS, FERRITIN in the last 72 hours. Invalid input(s): LABIRONS  MRI BRAIN W WO CONTRAST   Final Result       1. No evidence of acute intracranial abnormality. No MR evidence of central pontine myelinolysis. 2. Mild scattered focal areas of T2/FLAIR hyperintensity in the supratentorial matter are nonspecific but most likely represent sequelae of chronic microvascular angiopathy. **This report has been created using voice recognition software. It may contain minor errors which are inherent in voice recognition technology. **         Final report electronically signed by Dr. Jimmy Alfaro MD on 5/8/2021 4:59 PM      CT HEAD WO CONTRAST   Final Result   No acute intracranial pathology. **This report has been created using voice recognition software. It may contain minor errors which are inherent in voice recognition technology. **      Final report electronically signed by Dr. Clif Crabtree on 5/8/2021 11:18 AM      CT Head WO Contrast   Final Result   No acute intracranial process. **This report has been created using voice recognition software. It may contain minor errors which are inherent in voice recognition technology. **      Final report electronically signed by Dr. Jose Byrd on 5/6/2021 8:27 AM      XR CHEST PORTABLE   Final Result   1. Normal heart size. No effusion. 2. Minimal atelectasis/pneumonia right cardiophrenic sulcus. **This report has been created using voice recognition software.   It may contain minor errors which are inherent in voice recognition technology. **      Final report electronically signed by Dr. Italo Mullen on 5/6/2021 8:13 AM            Objective:   Vitals: BP (!) 165/88   Pulse 77   Temp 97.8 °F (36.6 °C) (Oral)   Resp 15   Ht 5' 11\" (1.803 m)   Wt 177 lb 0.5 oz (80.3 kg)   SpO2 97%   BMI 24.69 kg/m²    Wt Readings from Last 3 Encounters:   05/08/21 177 lb 0.5 oz (80.3 kg)   12/25/17 191 lb (86.6 kg)   11/02/17 196 lb (88.9 kg)      24HR INTAKE/OUTPUT:      Intake/Output Summary (Last 24 hours) at 5/10/2021 0844  Last data filed at 5/10/2021 0802  Gross per 24 hour   Intake 5010.27 ml   Output 2225 ml   Net 2785.27 ml       Constitutional:  Alert, awake, no apparent distress   Skin:normal with no rash or lesions. HEENT:Pupils are reactive . Throat is clear . Oral mucosa is moist   Neck:supple with no carotid bruit  Cardiovascular:  S1, S2 without murmur or rubs   Respiratory:  Clear to ausculation without wheezes, rhonchi or rales. Abdomen: +bs, soft, no tenderness to palpation and no palpable mass. No abdominal bruit   Ext: No LE edema  Musculoskeletal:Intact  Neuro:Alert and awake. Well oriented  with no focal deficit. Speech is normal      Electronically signed by Husam Hidalgo MD on 5/10/2021 at 8:44 AM  **This report has been created using voice recognition software. It maycontain minor  errors which are inherent in voice recognition technology. **

## 2021-05-10 NOTE — PROGRESS NOTES
Patient arrived from [address] to [address]. Vital signs obtained. Assessment completed. Personal items placed in locked cabinet.

## 2021-05-10 NOTE — PLAN OF CARE
Problem: Falls - Risk of:  Goal: Will remain free from falls  Description: Will remain free from falls  5/10/2021 0233 by Martin العراقي RN  Outcome: Ongoing  Note: Fall precautions in place. Fall bracelet, nonskid socks, fall door magnet, bed alarm on, and call light in reach. Goal: Absence of physical injury  Description: Absence of physical injury  5/10/2021 0233 by Martin العراقي RN  Outcome: Ongoing  Note: Absence of physical injury related to falls. Problem: Pain:  Goal: Pain level will decrease  Description: Pain level will decrease  5/10/2021 0233 by Martin العراقي RN  Outcome: Ongoing  Note: 0/10 pain      Problem: Skin Integrity:  Goal: Will show no infection signs and symptoms  Description: Will show no infection signs and symptoms  5/10/2021 0233 by Martin العراقي RN  Outcome: Ongoing  Note: No S&S of skin infection     Goal: Absence of new skin breakdown  Description: Absence of new skin breakdown  5/10/2021 0233 by Martin العراقي RN  Outcome: Ongoing  Note: Pt turns self        Outcome: Ongoing  Note: Discharge home tomorrow    Care plan reviewed with patient.  Will review and discuss care plan with family when available

## 2021-05-10 NOTE — PLAN OF CARE
Problem: RESPIRATORY  Goal: Clear lung sounds  Description: Clear lung sounds  Outcome: Ongoing   Breath sounds clear and diminished, continuing treatments as ordered.

## 2021-05-10 NOTE — CARE COORDINATION
5/10/21, 12:53 PM EDT    DISCHARGE ON GOING [address] day: 4  Location: -07/007-A Reason for admit: Sepsis [facility]) [A41.9]   Procedure:   5/8 CT Head: No acute findings  5/8 MRI Brain: No acute findings; no evidence of central pontine myelinolysis; Mild scattered focal areas of T2/FLAIR hyperintensity in the supratentorial matter are nonspecific but most likely represent sequelae of chronic microvascular angiopathy    Barriers to Discharge: On 5/8 was agitated and confused. Drinks 3-4 alcoholic beverages/day. Phenobarbital ETOH withdrawal protocol initiated. Transferred to ICU for precedex drip. Nephrology consulted for hyponatremia, overcorrection of hyponatremia. Precedex drip weaned off yesterday. D5 drip stopped today. Tmax 99.4. NSR. On room air. Ox4. Follows commands. IS. IV zithromax, IV rocephin, lovenox, pepcid, folic acid, nebs, lopressor, prn phenobarbital per ETOH withdrawal scale, thiamine. Received GI cocktail x1 this morning. Na+ 133, hgb 12.1. Blood cultures sent. Order to transfer to med/surg; awaiting bed assignment. PCP: Rosas Delgadillo MD  Readmission Risk Score: 13%  Patient Goals/Plan/Treatment Preferences: Home alone. Denies needs, declines HH. Continue to monitor for needs.

## 2021-05-11 VITALS
WEIGHT: 185.5 LBS | HEART RATE: 67 BPM | DIASTOLIC BLOOD PRESSURE: 71 MMHG | TEMPERATURE: 98.6 F | SYSTOLIC BLOOD PRESSURE: 149 MMHG | HEIGHT: 71 IN | OXYGEN SATURATION: 98 % | RESPIRATION RATE: 16 BRPM | BODY MASS INDEX: 25.97 KG/M2

## 2021-05-11 LAB
ANION GAP SERPL CALCULATED.3IONS-SCNC: 11 MEQ/L (ref 8–16)
BASOPHILS # BLD: 0.6 %
BASOPHILS ABSOLUTE: 0 THOU/MM3 (ref 0–0.1)
BLOOD CULTURE, ROUTINE: NORMAL
BUN BLDV-MCNC: 7 MG/DL (ref 7–22)
CALCIUM SERPL-MCNC: 8.9 MG/DL (ref 8.5–10.5)
CHLORIDE BLD-SCNC: 101 MEQ/L (ref 98–111)
CO2: 25 MEQ/L (ref 23–33)
CREAT SERPL-MCNC: 0.7 MG/DL (ref 0.4–1.2)
EOSINOPHIL # BLD: 1.1 %
EOSINOPHILS ABSOLUTE: 0.1 THOU/MM3 (ref 0–0.4)
ERYTHROCYTE [DISTWIDTH] IN BLOOD BY AUTOMATED COUNT: 13.2 % (ref 11.5–14.5)
ERYTHROCYTE [DISTWIDTH] IN BLOOD BY AUTOMATED COUNT: 47.2 FL (ref 35–45)
GFR SERPL CREATININE-BSD FRML MDRD: > 90 ML/MIN/1.73M2
GLUCOSE BLD-MCNC: 101 MG/DL (ref 70–108)
HCT VFR BLD CALC: 36.4 % (ref 42–52)
HEMOGLOBIN: 12.2 GM/DL (ref 14–18)
IMMATURE GRANS (ABS): 0.08 THOU/MM3 (ref 0–0.07)
IMMATURE GRANULOCYTES: 1.1 %
LYMPHOCYTES # BLD: 12.6 %
LYMPHOCYTES ABSOLUTE: 0.9 THOU/MM3 (ref 1–4.8)
MCH RBC QN AUTO: 32.4 PG (ref 26–33)
MCHC RBC AUTO-ENTMCNC: 33.5 GM/DL (ref 32.2–35.5)
MCV RBC AUTO: 96.8 FL (ref 80–94)
MONOCYTES # BLD: 20.2 %
MONOCYTES ABSOLUTE: 1.5 THOU/MM3 (ref 0.4–1.3)
NUCLEATED RED BLOOD CELLS: 0 /100 WBC
PLATELET # BLD: 284 THOU/MM3 (ref 130–400)
PMV BLD AUTO: 8.9 FL (ref 9.4–12.4)
POTASSIUM REFLEX MAGNESIUM: 4.2 MEQ/L (ref 3.5–5.2)
POTASSIUM SERPL-SCNC: 4.2 MEQ/L (ref 3.5–5.2)
RBC # BLD: 3.76 MILL/MM3 (ref 4.7–6.1)
SEG NEUTROPHILS: 64.4 %
SEGMENTED NEUTROPHILS ABSOLUTE COUNT: 4.6 THOU/MM3 (ref 1.8–7.7)
SODIUM BLD-SCNC: 137 MEQ/L (ref 135–145)
WBC # BLD: 7.2 THOU/MM3 (ref 4.8–10.8)

## 2021-05-11 PROCEDURE — 80048 BASIC METABOLIC PNL TOTAL CA: CPT

## 2021-05-11 PROCEDURE — 99232 SBSQ HOSP IP/OBS MODERATE 35: CPT | Performed by: NURSE PRACTITIONER

## 2021-05-11 PROCEDURE — 85025 COMPLETE CBC W/AUTO DIFF WBC: CPT

## 2021-05-11 PROCEDURE — 94640 AIRWAY INHALATION TREATMENT: CPT

## 2021-05-11 PROCEDURE — 6370000000 HC RX 637 (ALT 250 FOR IP): Performed by: STUDENT IN AN ORGANIZED HEALTH CARE EDUCATION/TRAINING PROGRAM

## 2021-05-11 PROCEDURE — 6370000000 HC RX 637 (ALT 250 FOR IP): Performed by: INTERNAL MEDICINE

## 2021-05-11 PROCEDURE — 94760 N-INVAS EAR/PLS OXIMETRY 1: CPT

## 2021-05-11 PROCEDURE — 99239 HOSP IP/OBS DSCHRG MGMT >30: CPT | Performed by: INTERNAL MEDICINE

## 2021-05-11 PROCEDURE — 36415 COLL VENOUS BLD VENIPUNCTURE: CPT

## 2021-05-11 PROCEDURE — 6370000000 HC RX 637 (ALT 250 FOR IP): Performed by: NURSE PRACTITIONER

## 2021-05-11 RX ORDER — LANOLIN ALCOHOL/MO/W.PET/CERES
100 CREAM (GRAM) TOPICAL DAILY
Qty: 30 TABLET | Refills: 0 | Status: SHIPPED | OUTPATIENT
Start: 2021-05-12

## 2021-05-11 RX ORDER — FOLIC ACID 1 MG/1
1 TABLET ORAL DAILY
Qty: 30 TABLET | Refills: 0 | Status: SHIPPED | OUTPATIENT
Start: 2021-05-12

## 2021-05-11 RX ORDER — MULTIVITAMIN WITH IRON
1 TABLET ORAL DAILY
Qty: 30 TABLET | Refills: 0 | Status: SHIPPED | OUTPATIENT
Start: 2021-05-12

## 2021-05-11 RX ORDER — METOPROLOL TARTRATE 50 MG/1
50 TABLET, FILM COATED ORAL 2 TIMES DAILY
Status: DISCONTINUED | OUTPATIENT
Start: 2021-05-11 | End: 2021-05-11 | Stop reason: HOSPADM

## 2021-05-11 RX ORDER — METOPROLOL TARTRATE 50 MG/1
50 TABLET, FILM COATED ORAL 2 TIMES DAILY
Qty: 60 TABLET | Refills: 0 | Status: SHIPPED | OUTPATIENT
Start: 2021-05-11

## 2021-05-11 RX ADMIN — Medication 1 TABLET: at 08:30

## 2021-05-11 RX ADMIN — FOLIC ACID 1 MG: 1 TABLET ORAL at 08:30

## 2021-05-11 RX ADMIN — FAMOTIDINE 20 MG: 20 TABLET ORAL at 08:32

## 2021-05-11 RX ADMIN — IPRATROPIUM BROMIDE AND ALBUTEROL SULFATE 1 AMPULE: .5; 3 SOLUTION RESPIRATORY (INHALATION) at 11:25

## 2021-05-11 RX ADMIN — METOPROLOL TARTRATE 25 MG: 25 TABLET, FILM COATED ORAL at 08:30

## 2021-05-11 RX ADMIN — CETIRIZINE HYDROCHLORIDE 10 MG: 10 TABLET, FILM COATED ORAL at 08:30

## 2021-05-11 RX ADMIN — Medication 100 MG: at 08:30

## 2021-05-11 NOTE — CARE COORDINATION
5/11/21, 11:59 AM EDT    Patient goals/plan/ treatment preferences discussed by  and . Patient goals/plan/ treatment preferences reviewed with patient/ family. Patient/ family verbalize understanding of discharge plan and are in agreement with goal/plan/treatment preferences. Understanding was demonstrated using the teach back method. AVS provided by RN at time of discharge, which includes all necessary medical information pertaining to the patients current course of illness, treatment, post-discharge goals of care, and treatment preferences. Discharging home independently. Denied needs. Addiction services consulted for information.

## 2021-05-11 NOTE — CARE COORDINATION
05/11/21 6:44 AM    Pt transferred to [facility]. Handoff report given to MARTIN Lau CM.

## 2021-05-11 NOTE — DISCHARGE SUMMARY
Hospital Medicine Discharge Summary      Patient Identification:   Pranav Hilario   : 1965  MRN: [id number]   Account: [de-identified]      Patient's PCP: Anurag Dyson MD    Admit Date: 2021     Discharge Date: 2021      Admitting Physician: Clint Bustamante MD     Discharge Physician: Elmo Palencia MD     Discharge Diagnoses:  Community Acquired Pneumonia: CXR demonstrates right cardiophrenic pneumonia versus atelectasis. Patient symptomatic with productive cough and yellow sputum. Patient is afebrile however with leukocytosis- resolved at the time discharge. Respiratory and Blood cultures negative. Urine Legionella and Srep pneumo Ag negative. COVID rapid negative. Breath sounds improved. S/p treatment with Ceftriaxone and Azithromycin x5 days with pulmonary hygiene and bronchodilator therapy.      Subacute on chronic severe hypotonic hypovolemic hyponatremia: Likely due to dehydration with diuretic use, pneumonia and and possible beer protamine. Patient's initial sodium was 118 patient received 2.5 L normal saline bolus in the emergency room department, corrected sodium to 134. Fluids were then adjusted to hypotonic solution. MRI was negative for demyelination. Home thiazide diuretic held inpatient. Will discontinue thiazide on admission, especially in the setting of patient's EtOH use disorder.     Acute alcohol withdrawl: History of significant alcohol use. Patient did demonstrate delirious features. Patient was given a trial of phenobarbital without improvement. S/p precedex. Reports no history of withdrawal seizure. Daily multivitamin, folate, thiamine. Counseled on quitting.     Primary hypertension:  Controlled at baseline. Started on Lopressor inpatient and home Hyzar held due to hyponatremia and hypercalcemia. Discontinue home Hyzar and continue Lopressor 50 mg BID on discharge. Follow up with PCP.     IDDM2: Controlled. HgA1c 5.7 21.  Resume home meds.     Hyperlipidemia: Resume home statin     Hypercalcemia-resolved: PTH independent. Initial calcium 12.8, current calcium 9.4. PTH was intact and suppressed. Normal alk phos and vitamin D.  TSH mildly elevated 1.43. Discontinue home HCTZ.     Lactic acidosis-resolved: Initial lactic acid 3.7, corrected with fluid to 1.4.      Sepsis secondary to pneumonia- resolved: Initial lactic acid 3.7, corrected with fluid to 1.4. Patient did not receive full sepsis fluids due to severe hyponatremia. Urine Legionella, urine strep, MRSA negative. The patient was seen and examined on day of discharge and this discharge summary is in conjunction with any daily progress note from day of discharge. Hospital Course:   Holden Burger is a 60 y.o. male admitted to [facility] on 5/6/2021 for chest pain, cough, and lightheadedness. Per initial HPI: 78-year-old male significant past medical history of hypertension, diabetes, hyperlipidemia, insulin-dependent diabetes seizure disorder,  presented [facility] with chest pain, cough, lightheadedness that has been going on for weeks but got worse in the past 2 days. Patient describes cough as productive with yellow sputum. Patient then developed substernal pressure chest pain that did not radiate with associated shortness of breath, lightheadedness, decreased appetite nausea and vomiting. Patient stated symptoms are constant. Patient reports 6 episodes of nonbilious nonbloody vomiting over the past couple patient denies fever, chills, abdominal pain, hematuria, dysuria, blood in stool, dark erythema past patient. Patient has had no sick contacts or recent travel.     Multiple falls before coming in, including significant scalp lac. Patient had staples placed while in ED 5/6/21. While in the Emergency department patient demonstrated sodium of 118, hyperkalemia 12.8 and anion gap 21.  Patient received 2.5 L NS in which sodium corrected 16 points (134) in the first 24 hours. Patient was also found to be smoking in the bathroom. Patient was admitted to hospital service however during that time he became agitated and confused and as result was sent to ICU for Precedex.      5/9/21: Patient appears to improve mentation. Patient calm and collected on exam.  Patient watching TV, and demonstrated normal interaction with myself as well as bedside sitter. 5/10/2021: Transferred to Step Down Unit. Exam:     Vitals:  Vitals:    05/11/21 0315 05/11/21 0828 05/11/21 1112 05/11/21 1126   BP: (!) 152/75 (!) 155/82 (!) 149/71    Pulse: 83 74 67    Resp: 18 18 18 16   Temp: 98.5 °F (36.9 °C) 98.8 °F (37.1 °C) 98.6 °F (37 °C)    TempSrc: Oral Oral Oral    SpO2: 94% 97% 95% 98%   Weight: 185 lb 8 oz (84.1 kg)      Height:         Weight: Weight: 185 lb 8 oz (84.1 kg)     24 hour intake/output:    Intake/Output Summary (Last 24 hours) at 5/11/2021 1615  Last data filed at 5/11/2021 0315  Gross per 24 hour   Intake 610 ml   Output --   Net 610 ml         General appearance:  No apparent distress, appears stated age and cooperative. HEENT:  normocephalic and right-sided head laceration with staples. No scleral icterus. Neck: supple. No Thyromegaly. Lungs: Clear breath sounds bilaterally with minimal wheezing noted. Productive cough- resolving. Cardiac: RRR. No JVD. Abdomen: soft. Nontender. Extremities:  No clubbing, cyanosis, or edema x 4. Vasculature: capillary refill < 3 seconds. Palpable dorsalis pedis pulses. Skin:  warm and dry. Psych:  Alert and oriented x3. Affect appropriate  Lymph:  No supraclavicular adenopathy. Neurologic:  No focal deficit. No seizures. Labs:  For convenience and continuity at follow-up the following most recent labs are provided:      CBC:    Lab Results   Component Value Date    WBC 7.2 05/11/2021    HGB 12.2 05/11/2021    HCT 36.4 05/11/2021     05/11/2021       Renal:    Lab Results   Component Value Date     05/11/2021    K 4.2 05/11/2021    K 4.2 05/11/2021     05/11/2021    CO2 25 05/11/2021    BUN 7 05/11/2021    CREATININE 0.7 05/11/2021    CALCIUM 8.9 05/11/2021         Significant Diagnostic Studies    Radiology:   MRI BRAIN W WO CONTRAST   Final Result       1. No evidence of acute intracranial abnormality. No MR evidence of central pontine myelinolysis. 2. Mild scattered focal areas of T2/FLAIR hyperintensity in the supratentorial matter are nonspecific but most likely represent sequelae of chronic microvascular angiopathy. **This report has been created using voice recognition software. It may contain minor errors which are inherent in voice recognition technology. **         Final report electronically signed by Dr. Dahiana Greenberg MD on 5/8/2021 4:59 PM      CT HEAD WO CONTRAST   Final Result   No acute intracranial pathology. **This report has been created using voice recognition software. It may contain minor errors which are inherent in voice recognition technology. **      Final report electronically signed by Dr. Ashley Larose on 5/8/2021 11:18 AM      CT Head WO Contrast   Final Result   No acute intracranial process. **This report has been created using voice recognition software. It may contain minor errors which are inherent in voice recognition technology. **      Final report electronically signed by Dr. Berhane Beal on 5/6/2021 8:27 AM      XR CHEST PORTABLE   Final Result   1. Normal heart size. No effusion. 2. Minimal atelectasis/pneumonia right cardiophrenic sulcus. **This report has been created using voice recognition software. It may contain minor errors which are inherent in voice recognition technology. **      Final report electronically signed by Dr. Berahne eBal on 5/6/2021 8:13 AM             Consults:     IP CONSULT TO NEPHROLOGY  IP CONSULT TO ADDICTION SERVICES    Disposition: Home  Condition at Discharge: Stable    Code Status:  Prior     Patient Instructions:    Discharge lab work: Activity: activity as tolerated  Diet: No diet orders on file      Follow-up visits:   Tristan Hooks MD  [address] [address]  [phone]      Office is closed for lunch. Call after 1245 pm for an appointment for end of this week or beginning of next week. Discharge Medications:      Shine Vicente   [place] Medication Instructions [id number]    Printed on:05/11/21 6202   Medication Information                      Budesonide-Formoterol Fumarate (SYMBICORT IN)  Inhale into the lungs daily as needed             esomeprazole Magnesium (NEXIUM) 20 MG PACK  Take 20 mg by mouth daily as needed              folic acid (FOLVITE) 1 MG tablet  Take 1 tablet by mouth daily             Loratadine-Pseudoephedrine (CLARITIN-D 24 HOUR PO)  Take 1 tablet by mouth daily             metFORMIN (GLUCOPHAGE) 500 MG tablet  Take 500 mg by mouth daily (with breakfast)              metoprolol tartrate (LOPRESSOR) 50 MG tablet  Take 1 tablet by mouth 2 times daily             Multiple Vitamin (MULTIVITAMIN) TABS tablet  Take 1 tablet by mouth daily             pravastatin (PRAVACHOL) 80 MG tablet  Take 40 mg by mouth daily              thiamine 100 MG tablet  Take 1 tablet by mouth daily                 Time Spent on discharge is more than 30 minutes in the examination, evaluation, counseling and review of medications and discharge plan.       Signed:    Electronically signed by Katja Camarena MD on 5/11/2021 at 4:15 PM

## 2021-05-11 NOTE — PROGRESS NOTES
CRITICAL CARE PROGRESS NOTE      Patient:  Monica Mendosa    Unit/Bed:4K-08/008-A  YOB: 1965  MRN: [id number]   PCP: Eddie Haque MD  Date of Admission: 5/6/2021  Chief Complaint:-Shortness of breath, chest pain and lightheadedness    Assessment and Plan:      Community Acquired Pneumonia: CXR demonstrates right cardiophrenic pneumonia versus atelectasis. Patient symptomatic with productive cough and yellow sputum. Patient is afebrile however does have leukocytosis. Breath sounds improving   -Continue ceftriaxone   -DuoNebs every 4 hours   -Pulmonary hygiene     Subacute on chronic severe hypotonic hypovolemic hyponatremia: Likely due to dehydration with diuretic use, pneumonia and and possible beer protamine. Patient's initial sodium was 118 patient received 2.5 L normal saline bolus in the emergency room department and her corrected sodium to 134. Fluids were then adjusted to hypotonic solution. MRI was for demyelination.     -Nephrology following   -Continue fluid restriction    Acute alcohol withdrawl: History of significant alcohol use. Patient did demonstrate delirious features. Patient was given a trial of phenobarbital without improvement.    -Precedex has been stopped. Patient at this time does not demonstrate much for symptoms of alcohol withdrawal.  Does seem to be a little bit delirious at times.   -Daily multivitamin, folate, thiamine     Primary hypertension:  Controlled at baseline however elevated this morning. Will consider increasing Lopressor.    -Hold home Hyzar due to hyponatremia and hypercalcemia     -Continue Lopressor BID   -Continue to monitor    IDDM2: Controlled. HgA1c 5.7 5/6/21. Goal blood glucose 140 -180   -Hold home meds   -Hypoglycemic protocol   -Patient not requiring insulin sliding scale this time. Hyperlipidemia: Resume home statin    Hypercalcemia-resolved: PTH independent. Initial calcium 12.8, current calcium 9.4.   PTH was intact and suppressed. Normal alk phos and vitamin D.  TSH mildly elevated 1.43   -Hold hydrochlorothiazide    Lactic acidosis-resolved: Initial lactic acid 3.7, corrected with fluid to 1.4. Sepsis secondary to pneumonia- resolved: Initial lactic acid 3.7, corrected with fluid to 1.4. Patient did not receive full sepsis fluids due to severe hyponatremia. Urine Legionella, urine strep, MRSA negative     INITIAL H AND P AND ICU COURSE:  53-year-old male significant past medical history of hypertension, diabetes, hyperlipidemia, insulin-dependent diabetes seizure disorder,  presented [address] with chest pain, cough, lightheadedness that has been going on for weeks but got worse in the past 2 days. Patient describes cough as productive with yellow sputum. Patient then developed substernal pressure chest pain that did not radiate with associated shortness of breath, lightheadedness, decreased appetite nausea and vomiting. Patient stated symptoms are constant. Patient reports 6 episodes of nonbilious nonbloody vomiting over the past couple patient denies fever, chills, abdominal pain, hematuria, dysuria, blood in stool, dark erythema past patient. Patient has had no sick contacts or recent travel. Multiple falls before coming in, including significant scalp lac. Patient had staples placed while in ED 5/6/21. While in the Emergency department patient demonstrated sodium of 118, hyperkalemia 12.8 and anion gap 21. Patient received 2.5 L NS in which sodium corrected 16 points (134) in the first 24 hours. Patient was also found to be smoking in the bathroom. Patient was admitted to hospital service however during that time he became agitated and confused and as result was sent to ICU for Precedex. 5/9/21: Patient appears to improve mentation. Patient calm and collected on exam.  Patient watching TV, and demonstrated normal interaction with myself as well as bedside sitter.     Past Medical History: (L) 42.0 - 52.0 %    MCV 95.5 (H) 80.0 - 94.0 fL    MCH 32.1 26.0 - 33.0 pg    MCHC 33.6 32.2 - 35.5 gm/dl    RDW-CV 12.9 11.5 - 14.5 %    RDW-SD 45.1 (H) 35.0 - 45.0 fL    Platelets 570 468 - 171 thou/mm3    MPV 8.8 (L) 9.4 - 12.4 fL    Seg Neutrophils 67.8 %    Lymphocytes 11.6 %    Monocytes 17.8 %    Eosinophils 1.2 %    Basophils 0.6 %    Immature Granulocytes 1.0 %    Segs Absolute 5.5 1 - 7 thou/mm3    Lymphocytes Absolute 0.9 (L) 1.0 - 4.8 thou/mm3    Monocytes Absolute 1.4 (H) 0.4 - 1.3 thou/mm3    Eosinophils Absolute 0.1 0.0 - 0.4 thou/mm3    Basophils Absolute 0.0 0.0 - 0.1 thou/mm3    Immature Grans (Abs) 0.08 (H) 0.00 - 0.07 thou/mm3    nRBC 0 /100 wbc   Basic Metabolic Panel    Collection Time: 05/10/21  4:21 AM   Result Value Ref Range    Sodium 133 (L) 135 - 145 meq/L    Potassium 3.6 3.5 - 5.2 meq/L    Chloride 98 98 - 111 meq/L    CO2 26 23 - 33 meq/L    Glucose 116 (H) 70 - 108 mg/dL    BUN 7 7 - 22 mg/dL    CREATININE 0.7 0.4 - 1.2 mg/dL    Calcium 9.0 8.5 - 10.5 mg/dL   Anion Gap    Collection Time: 05/10/21  4:21 AM   Result Value Ref Range    Anion Gap 9.0 8.0 - 16.0 meq/L   Glomerular Filtration Rate, Estimated    Collection Time: 05/10/21  4:21 AM   Result Value Ref Range    Est, Glom Filt Rate >90 ml/min/1.73m2           Electronically signed by Arpita Randall MD      Electronically signed by Arpita Randall MD on 5/10/2021 at 10:53 PM

## 2021-05-11 NOTE — PROGRESS NOTES
Discharge teaching and instructions for diagnosis/procedure of sepsis completed with patient using teachback method. AVS reviewed. Printed prescriptions given to patient. Patient voiced understanding regarding prescriptions, follow up appointments, and care of self at home. Discharged ambulatory to  home with support per friend.

## 2021-05-11 NOTE — PROGRESS NOTES
Nephrology Progress Note    Patient - Becca Choi   MRN -  [id number]   [facility] # - [de-identified]      - 1965    60 y.o. Admit Date: 2021  Hospital Day: 5  Location: -008-A  Date of evaluation -  2021    Subjective:   CC: chest pain, shortness of breath   Denies shortness of breath on Room air   Good appetite  +  BP Range: Systolic (79KZT), ARJ:377 , Min:138 , TVK:930      Diastolic (71PEX), YAL:80, Min:74, Max:96    Objective:   VITALS:  BP (!) 155/82   Pulse 74   Temp 98.8 °F (37.1 °C) (Oral)   Resp 18   Ht 5' 11\" (1.803 m)   Wt 185 lb 8 oz (84.1 kg)   SpO2 97%   BMI 25.87 kg/m²    Patient Vitals for the past 24 hrs:   BP Temp Temp src Pulse Resp SpO2 Weight   21 0828 (!) 155/82 98.8 °F (37.1 °C) Oral 74 18 97 % --   21 0315 (!) 152/75 98.5 °F (36.9 °C) Oral 83 18 94 % 185 lb 8 oz (84.1 kg)   05/10/21 2300 (!) 142/81 98.4 °F (36.9 °C) Oral 94 17 95 % --   05/10/21 2130 (!) 143/74 98 °F (36.7 °C) Oral 60 16 97 % --   05/10/21 2015 -- -- -- -- 12 98 % --   05/10/21 1500 (!) 168/96 97.8 °F (36.6 °C) Oral 85 18 98 % --   05/10/21 1400 -- -- -- 91 16 -- --   05/10/21 1300 -- -- -- 83 13 -- --   05/10/21 1200 -- -- -- -- 18 96 % --   05/10/21 1157 (!) 138/93 98.3 °F (36.8 °C) Oral 73 14 96 % --   05/10/21 1100 -- -- -- 81 20 -- --   05/10/21 1000 -- -- -- 71 13 -- --       Intake/Output Summary (Last 24 hours) at 2021 0945  Last data filed at 2021 0315  Gross per 24 hour   Intake 610 ml   Output --   Net 610 ml       Admission weight: 186 lb (84.4 kg)  Patient Vitals for the past 96 hrs (Last 3 readings):   Weight   21 0315 185 lb 8 oz (84.1 kg)   21 0600 177 lb 0.5 oz (80.3 kg)     Body mass index is 25.87 kg/m². EXAM:  CONSTITUTIONAL:  No acute distress. Pleasant  HEENT:  Head is normocephalic, Extraocular movement intact. Neck is supple. Voice is clear. CARDIOVASCULAR:  S1, S2  regular rate and rhythm.   RESPIRATORY: Clear to ausculation bilaterally. Equal breath sounds. No wheezes. No shortness of breath noted at rest.  ABDOMEN: soft, non tender  NEUROLOGICAL: Patient is alert and oriented to person, place, and time. Recent and remote memory intact. Thought is coherant. SKIN: no rash, No significant bruises on exposed surfaces  MUSCULOSKELETAL: Movement is coordinated. Moves all extremities   EXTREMITIES: Distal lower extremity temp is warm, No lower extremity edema. PSYCHIATRIC: mood and affect appropriate. Medications:   Med reviewed  Scheduled Meds:   ipratropium-albuterol  1 ampule Inhalation Q4H WA    cetirizine  10 mg Oral Daily    folic acid  1 mg Oral Daily    thiamine  100 mg Oral Daily    multivitamin  1 tablet Oral Daily    metoprolol tartrate  25 mg Oral BID    cefTRIAXone (ROCEPHIN) IV  1,000 mg Intravenous Q24H    pravastatin  40 mg Oral Nightly    sodium chloride flush  5-40 mL Intravenous 2 times per day    enoxaparin  40 mg Subcutaneous Q24H    famotidine  20 mg Oral BID    azithromycin  500 mg Intravenous Q24H     Continuous Infusions:   sodium chloride      dextrose       PRN Meds PHENobarbital **OR** PHENobarbital **OR** PHENobarbital IVPB, sodium chloride flush, sodium chloride, promethazine **OR** ondansetron, polyethylene glycol, acetaminophen **OR** acetaminophen, glucose, dextrose, glucagon (rDNA), dextrose   Labs:   Labs reviewed  Recent Labs     05/09/21  0529 05/10/21  0421 05/11/21  0431   WBC 6.9 8.1 7.2   RBC 3.84* 3.77* 3.76*   HGB 12.3* 12.1* 12.2*   HCT 36.6* 36.0* 36.4*   MCV 95.3* 95.5* 96.8*   MCH 32.0 32.1 32.4    279 284     Recent Labs     05/09/21  0442 05/10/21  0421 05/11/21  0431    133* 137   K 4.0 3.6 4.2  4.2    98 101   CO2 26 26 25   BUN 10 7 7   CREATININE 0.6 0.7 0.7   LABGLOM >90 >90 >90   GLUCOSE 136* 116* 101   CALCIUM 9.4 9.0 8.9       ASSESSMENT:  1. Severe hyponatremia likely 2nd to Thiazide diuretic. With rapid correction. Na now overall stable. 2. Hx of Alcohol use   3. Pneumonia   4. Essential Hypertension. BP borderline high, Heart rate 80s. Increase metoprolol from 25 mg to 50 mg 2x/d.  5. Diabetes Mellitus Type II with long term use of insulin     Patient Active Problem   BMP in AM  Active Problems:    Sepsis ([address]  Resolved Problems:    * No resolved hospital problems.  Allyson Lopez, TALIB - CNP 9:45 AM 5/11/2021

## 2021-05-11 NOTE — PLAN OF CARE
Problem: Falls - Risk of:  Goal: Will remain free from falls  Description: Will remain free from falls  Outcome: Ongoing  Note: Patient in bed, call light and items in reach, bed alarm on. Goal: Absence of physical injury  Description: Absence of physical injury  Outcome: Ongoing  Note: No falls or injury this shift. Problem: Pain:  Goal: Pain level will decrease  Description: Pain level will decrease  Outcome: Ongoing  Goal: Control of acute pain  Description: Control of acute pain  Outcome: Ongoing  Note: Pain Assessment: 0-10  Pain Level: 0   Patient's Stated Pain Goal: No pain   Is pain goal met at this time? Yes          Goal: Control of chronic pain  Description: Control of chronic pain  Outcome: Ongoing     Problem: Skin Integrity:  Goal: Will show no infection signs and symptoms  Description: Will show no infection signs and symptoms  Outcome: Ongoing  Note: No infection s/s at this time. Goal: Absence of new skin breakdown  Description: Absence of new skin breakdown  Outcome: Ongoing     Problem: Discharge Planning:  Goal: Discharged to appropriate level of care  Description: Patient is from home alone and plans to discharge to home. Discussed need to follow up regarding alcoholism. May need addiction services consult before discharge and list of community resources.   Outcome: Ongoing

## 2021-05-13 LAB — BLOOD CULTURE, ROUTINE: NORMAL

## 2021-05-14 NOTE — PROGRESS NOTES
CLINICAL PHARMACY NOTE: MEDS TO [address] Select Patient?: No  Total # of Prescriptions Filled: 4   The following medications were delivered to the patient:   Thiamine 100mg  MVI  Metoprolol Tartrate 49NZ  Folic Acid 1mg  Total # of Interventions Completed: 2  Time Spent (min): 30    Additional Documentation:

## 2022-01-15 ENCOUNTER — HOSPITAL ENCOUNTER (EMERGENCY)
Age: 57
Discharge: HOME | End: 2022-01-15
Payer: COMMERCIAL

## 2022-01-15 VITALS
RESPIRATION RATE: 16 BRPM | DIASTOLIC BLOOD PRESSURE: 78 MMHG | SYSTOLIC BLOOD PRESSURE: 165 MMHG | OXYGEN SATURATION: 98 % | TEMPERATURE: 97.9 F | HEART RATE: 78 BPM

## 2022-01-15 DIAGNOSIS — B34.9 VIRAL ILLNESS: Primary | ICD-10-CM

## 2022-01-15 DIAGNOSIS — Z20.822 SUSPECTED COVID-19 VIRUS INFECTION: ICD-10-CM

## 2022-01-15 LAB
INFLUENZA A: NOT DETECTED
INFLUENZA B: NOT DETECTED
SARS-COV-2 RNA, RT PCR: NOT DETECTED

## 2022-01-15 PROCEDURE — 87636 SARSCOV2 & INF A&B AMP PRB: CPT

## 2022-01-15 PROCEDURE — 99213 OFFICE O/P EST LOW 20 MIN: CPT | Performed by: EMERGENCY MEDICINE

## 2022-01-15 PROCEDURE — G0463 HOSPITAL OUTPT CLINIC VISIT: HCPCS

## 2022-01-15 PROCEDURE — 99213 OFFICE O/P EST LOW 20 MIN: CPT

## 2022-01-15 ASSESSMENT — ENCOUNTER SYMPTOMS
SORE THROAT: 1
ABDOMINAL PAIN: 0
SHORTNESS OF BREATH: 0
SINUS PAIN: 1
VOMITING: 0
RHINORRHEA: 1
DIARRHEA: 0
COLOR CHANGE: 0
SINUS PRESSURE: 1
NAUSEA: 0
COUGH: 1

## 2022-01-15 NOTE — ED PROVIDER NOTES
[facility]  Urgent Care Encounter       CHIEF COMPLAINT       Chief Complaint   Patient presents with    Cough    Congestion    Fever       Nurses Notes reviewed and I agree except as noted in the HPI. HISTORY OF PRESENT ILLNESS   Albert Hawley is a 60 y.o. male who presents for concerns for COVID-19. Patient has nasal congestion, body aches, chills, sore throat. Occasional cough. No shortness of breath. Symptoms have been present for 3 days. Patient states that he works at [employer] and they requested him to be tested for COVID-19. Patient states currently 10% of the plan is out with COVID-19. Patient has had 2 doses of the Moderna COVID-vaccine. He is not due for his booster shot yet. HPI    REVIEW OF SYSTEMS     Review of Systems   Constitutional: Positive for chills and fatigue. Negative for fever. HENT: Positive for congestion, rhinorrhea, sinus pressure, sinus pain and sore throat. Respiratory: Positive for cough. Negative for shortness of breath. Cardiovascular: Negative for chest pain. Gastrointestinal: Negative for abdominal pain, diarrhea, nausea and vomiting. Skin: Negative for color change. Neurological: Positive for headaches. Negative for dizziness. Psychiatric/Behavioral: Negative for behavioral problems. PAST MEDICAL HISTORY         Diagnosis Date    Diabetes mellitus ([address]     GERD (gastroesophageal reflux disease)     Hyperlipidemia     Hypertension     Seizures (HCC)     Sleep apnea     does not use cpap       SURGICALHISTORY     Patient  has a past surgical history that includes Colonoscopy.     CURRENT MEDICATIONS       Previous Medications    BUDESONIDE-FORMOTEROL FUMARATE (SYMBICORT IN)    Inhale into the lungs daily as needed    ESOMEPRAZOLE MAGNESIUM (NEXIUM) 20 MG PACK    Take 20 mg by mouth daily as needed     FOLIC ACID (FOLVITE) 1 MG TABLET    Take 1 tablet by mouth daily    LORATADINE-PSEUDOEPHEDRINE (CLARITIN-D 24 HOUR PO)    Take 1 tablet by mouth daily    METFORMIN (GLUCOPHAGE) 500 MG TABLET    Take 500 mg by mouth daily (with breakfast)     METOPROLOL TARTRATE (LOPRESSOR) 50 MG TABLET    Take 1 tablet by mouth 2 times daily    MULTIPLE VITAMIN (MULTIVITAMIN) TABS TABLET    Take 1 tablet by mouth daily    PRAVASTATIN (PRAVACHOL) 80 MG TABLET    Take 40 mg by mouth daily     THIAMINE 100 MG TABLET    Take 1 tablet by mouth daily       ALLERGIES     Patient is has No Known Allergies. Patients   Immunization History   Administered Date(s) Administered    Tdap (Boostrix, Adacel) 01/16/2017       FAMILY HISTORY     Patient's family history includes High Blood Pressure in his father; High Cholesterol in his father. SOCIAL HISTORY     Patient  reports that he has been smoking cigarettes. He has a 20.00 pack-year smoking history. He has never used smokeless tobacco. He reports current alcohol use. He reports that he does not use drugs. PHYSICAL EXAM     ED TRIAGE VITALS  BP: (!) 165/78, Temp: 97.9 °F (36.6 °C), Pulse: 78, Resp: 16, SpO2: 98 %,Estimated body mass index is 25.87 kg/m² as calculated from the following:    Height as of 5/6/21: 5' 11\" (1.803 m). Weight as of 5/11/21: 185 lb 8 oz (84.1 kg). ,No LMP for male patient. Physical Exam  Constitutional:       General: He is not in acute distress. Appearance: He is normal weight. He is ill-appearing. HENT:      Head: Normocephalic. Nose: Nose normal. No congestion or rhinorrhea. Mouth/Throat:      Mouth: Mucous membranes are moist.   Cardiovascular:      Rate and Rhythm: Normal rate and regular rhythm. Pulses: Normal pulses. Pulmonary:      Effort: Pulmonary effort is normal. No respiratory distress. Breath sounds: Wheezing (faint expiratory wheezing) present. Abdominal:      General: Abdomen is flat. Bowel sounds are normal. There is no distension. Tenderness: There is no abdominal tenderness.    Skin:     General: Skin is warm and dry. Capillary Refill: Capillary refill takes less than 2 seconds. Neurological:      General: No focal deficit present. Mental Status: He is alert. Psychiatric:         Mood and Affect: Mood normal.         DIAGNOSTIC RESULTS     Labs:No results found for this visit on 01/15/22. IMAGING:    No orders to display         EKG:      URGENT CARE COURSE:     Vitals:    01/15/22 1423   BP: (!) 165/78   Pulse: 78   Resp: 16   Temp: 97.9 °F (36.6 °C)   TempSrc: Temporal   SpO2: 98%       Medications - No data to display         PROCEDURES:  None    FINAL IMPRESSION      1. Viral illness    2. Suspected COVID-19 virus infection          DISPOSITION/ PLAN     Presents for suspected COVID-19 virus infection. Patient's employer requires PCR testing and this is in process. Results available tomorrow morning. Patient is advised he likely has COVID-19 and advised to isolate from others. Drink plenty fluids. Tylenol/ibuprofen. I advised stopping smoking. I did discuss monoclonal antibodies with the patient and provided a handout about information for this treatment. He is strongly encouraged to return to the emergency department for any worsening cough, chest pain, shortness of breath or new concerns.       PATIENT REFERRED TO:  Constantine Shields MD  [address] [facility] / [facility] [address]      DISCHARGE MEDICATIONS:  New Prescriptions    No medications on file       Discontinued Medications    No medications on file       Current Discharge Medication List          TALIB Otero CNP    (Please note that portions of this note were completed with a voice recognition program. Efforts were made to edit the dictations but occasionally words are mis-transcribed.)          TALIB Otero CNP  01/15/22 3297

## 2022-01-15 NOTE — ED TRIAGE NOTES
Esa Murphy arrives to room with complaint of cough congestion sweats chills symptoms started 2 days ago. Esa Murphy friend at work has covid Esa Jeffrey works at [employer].

## 2023-01-30 ENCOUNTER — HOSPITAL ENCOUNTER (EMERGENCY)
Age: 58
Discharge: HOME | End: 2023-01-30
Payer: COMMERCIAL

## 2023-01-30 VITALS
HEIGHT: 71 IN | RESPIRATION RATE: 18 BRPM | BODY MASS INDEX: 26.6 KG/M2 | HEART RATE: 72 BPM | SYSTOLIC BLOOD PRESSURE: 179 MMHG | OXYGEN SATURATION: 92 % | DIASTOLIC BLOOD PRESSURE: 82 MMHG | TEMPERATURE: 97.9 F | WEIGHT: 190 LBS

## 2023-01-30 DIAGNOSIS — J40 BRONCHITIS: Primary | ICD-10-CM

## 2023-01-30 PROCEDURE — 99213 OFFICE O/P EST LOW 20 MIN: CPT

## 2023-01-30 PROCEDURE — 99213 OFFICE O/P EST LOW 20 MIN: CPT | Performed by: NURSE PRACTITIONER

## 2023-01-30 RX ORDER — AZITHROMYCIN 250 MG/1
TABLET, FILM COATED ORAL
Qty: 1 PACKET | Refills: 0 | Status: SHIPPED | OUTPATIENT
Start: 2023-01-30

## 2023-01-30 RX ORDER — BENZONATATE 200 MG/1
200 CAPSULE ORAL 3 TIMES DAILY PRN
Qty: 21 CAPSULE | Refills: 0 | Status: SHIPPED | OUTPATIENT
Start: 2023-01-30 | End: 2023-02-06

## 2023-01-30 RX ORDER — LORATADINE 10 MG/1
10 CAPSULE, LIQUID FILLED ORAL DAILY
COMMUNITY

## 2023-01-30 RX ORDER — ALBUTEROL SULFATE 90 UG/1
2 AEROSOL, METERED RESPIRATORY (INHALATION) EVERY 4 HOURS PRN
Qty: 18 G | Refills: 0 | Status: SHIPPED | OUTPATIENT
Start: 2023-01-30

## 2023-01-30 ASSESSMENT — ENCOUNTER SYMPTOMS
VOMITING: 0
SORE THROAT: 0
SHORTNESS OF BREATH: 0
WHEEZING: 1
NAUSEA: 0
COUGH: 1
DIARRHEA: 0

## 2023-01-30 ASSESSMENT — PAIN - FUNCTIONAL ASSESSMENT: PAIN_FUNCTIONAL_ASSESSMENT: NONE - DENIES PAIN

## 2023-01-30 NOTE — ED PROVIDER NOTES
[address]  Urgent Care Encounter       CHIEF COMPLAINT       Chief Complaint   Patient presents with    Cough    Sinusitis       Nurses Notes reviewed and I agree except as noted in the HPI. HISTORY OF PRESENT ILLNESS   Momo Rene is a 59 y.o. male who presents for evaluation of cough and congestion that been ongoing for the past 1.5 weeks. Patient denies any fever, chills, or shortness of breath. He states that he is a pack per day smoker. He denies any medications interventions at home. He denies any known sick exposures. The history is provided by the patient. REVIEW OF SYSTEMS     Review of Systems   Constitutional:  Negative for chills and fever. HENT:  Positive for congestion. Negative for sore throat. Respiratory:  Positive for cough and wheezing. Negative for shortness of breath. Cardiovascular:  Negative for chest pain. Gastrointestinal:  Negative for diarrhea, nausea and vomiting. Musculoskeletal:  Negative for arthralgias and myalgias. Skin:  Negative for rash. Allergic/Immunologic: Positive for immunocompromised state. Neurological:  Negative for headaches. PAST MEDICAL HISTORY         Diagnosis Date    Diabetes mellitus ([facility]     GERD (gastroesophageal reflux disease)     Hyperlipidemia     Hypertension     Seizures (HCC)     Sleep apnea     does not use cpap       SURGICALHISTORY     Patient  has a past surgical history that includes Colonoscopy.     CURRENT MEDICATIONS       Previous Medications    BUDESONIDE-FORMOTEROL FUMARATE (SYMBICORT IN)    Inhale into the lungs daily as needed    ESOMEPRAZOLE MAGNESIUM (NEXIUM) 20 MG PACK    Take 20 mg by mouth daily as needed     FOLIC ACID (FOLVITE) 1 MG TABLET    Take 1 tablet by mouth daily    LORATADINE (CLARITIN) 10 MG CAPSULE    Take 10 mg by mouth daily    METFORMIN (GLUCOPHAGE) 500 MG TABLET    Take 500 mg by mouth daily (with breakfast)     METOPROLOL TARTRATE (LOPRESSOR) 50 MG TABLET Take 1 tablet by mouth 2 times daily    MULTIPLE VITAMIN (MULTIVITAMIN) TABS TABLET    Take 1 tablet by mouth daily    PRAVASTATIN (PRAVACHOL) 80 MG TABLET    Take 40 mg by mouth daily     THIAMINE 100 MG TABLET    Take 1 tablet by mouth daily       ALLERGIES     Patient is has No Known Allergies. Patients   Immunization History   Administered Date(s) Administered    Tdap (Boostrix, Adacel) 01/16/2017       FAMILY HISTORY     Patient's family history includes High Blood Pressure in his father; High Cholesterol in his father. SOCIAL HISTORY     Patient  reports that he has been smoking cigarettes. He has a 20.00 pack-year smoking history. He has never used smokeless tobacco. He reports current alcohol use. He reports that he does not use drugs. PHYSICAL EXAM     ED TRIAGE VITALS  BP: (!) 179/82, Temp: 97.9 °F (36.6 °C), Heart Rate: 72, Resp: 18, SpO2: 92 %,Estimated body mass index is 26.5 kg/m² as calculated from the following:    Height as of this encounter: 5' 11\" (1.803 m). Weight as of this encounter: 190 lb (86.2 kg). ,No LMP for male patient. Physical Exam  Vitals and nursing note reviewed. Constitutional:       General: He is not in acute distress. Appearance: He is well-developed. He is not diaphoretic. Eyes:      Conjunctiva/sclera:      Right eye: Right conjunctiva is not injected. Left eye: Left conjunctiva is not injected. Pupils: Pupils are equal.   Cardiovascular:      Rate and Rhythm: Normal rate and regular rhythm. Heart sounds: No murmur heard. Pulmonary:      Effort: Pulmonary effort is normal. No respiratory distress. Breath sounds: Examination of the right-upper field reveals wheezing. Examination of the left-upper field reveals wheezing. Examination of the right-middle field reveals wheezing. Examination of the left-middle field reveals wheezing. Wheezing present. Musculoskeletal:      Cervical back: Normal range of motion.    Skin:     General: Skin is warm. Findings: No rash. Neurological:      Mental Status: He is alert and oriented to person, place, and time. Psychiatric:         Behavior: Behavior normal.       DIAGNOSTIC RESULTS     Labs:No results found for this visit on 01/30/23. IMAGING:    No orders to display         EKG:      URGENT CARE COURSE:     Vitals:    01/30/23 0950   BP: (!) 179/82   Pulse: 72   Resp: 18   Temp: 97.9 °F (36.6 °C)   TempSrc: Temporal   SpO2: 92%   Weight: 190 lb (86.2 kg)   Height: 5' 11\" (1.803 m)       Medications - No data to display         PROCEDURES:  None    FINAL IMPRESSION      1. Bronchitis          DISPOSITION/ PLAN     Exam is consistent with bronchitis at this time. Discussed with the patient we will plan to treat with a Z-Heladio as well as Tessalon Perles and albuterol inhaler. He is advised to rest and hydrate at home and follow-up on an outpatient basis if symptoms do not improve or worsen. He is agreeable to the plan as discussed. PATIENT REFERRED TO:  Ashtyn Heath MD  [facility] / [facility] [address]      DISCHARGE MEDICATIONS:  New Prescriptions    ALBUTEROL SULFATE HFA (PROVENTIL;VENTOLIN;PROAIR) 108 (90 BASE) MCG/ACT INHALER    Inhale 2 puffs into the lungs every 4 hours as needed for Wheezing or Shortness of Breath    AZITHROMYCIN (ZITHROMAX Z-HELADIO) 250 MG TABLET    Take 2 tablets (500 mg) on Day 1, and then take 1 tablet (250 mg) on days 2 through 5.     BENZONATATE (TESSALON) 200 MG CAPSULE    Take 1 capsule by mouth 3 times daily as needed for Cough       Discontinued Medications    LORATADINE-PSEUDOEPHEDRINE (CLARITIN-D 24 HOUR PO)    Take 1 tablet by mouth daily       Current Discharge Medication List          TALIB Wright CNP    (Please note that portions of this note were completed with a voice recognition program. Efforts were made to edit the dictations but occasionally words are mis-transcribed.)          TALIB Wright CNP  01/30/23 100

## 2023-01-30 NOTE — ED NOTES
PT GIVEN DISCHARGE INSTRUCTIONS, VERBALIZES UNDERSTANDING. PT ASSESSMENT UNCHANGED, DISCHARGED IN STABLE CONDITION.          Becky Gerardo RN  01/30/23 0489

## 2024-04-05 ENCOUNTER — HOSPITAL ENCOUNTER (INPATIENT)
Age: 59
LOS: 2 days | Discharge: HOME | DRG: 309 | End: 2024-04-07
Attending: INTERNAL MEDICINE
Payer: COMMERCIAL

## 2024-04-05 ENCOUNTER — APPOINTMENT (OUTPATIENT)
Dept: CT IMAGING | Age: 59
DRG: 309 | End: 2024-04-05
Payer: COMMERCIAL

## 2024-04-05 ENCOUNTER — APPOINTMENT (OUTPATIENT)
Dept: MRI IMAGING | Age: 59
DRG: 309 | End: 2024-04-05
Payer: COMMERCIAL

## 2024-04-05 ENCOUNTER — APPOINTMENT (OUTPATIENT)
Dept: GENERAL RADIOLOGY | Age: 59
DRG: 309 | End: 2024-04-05
Payer: COMMERCIAL

## 2024-04-05 DIAGNOSIS — R42 DIZZINESS: Primary | ICD-10-CM

## 2024-04-05 DIAGNOSIS — I48.91 ATRIAL FIBRILLATION WITH RVR (HCC): ICD-10-CM

## 2024-04-05 PROBLEM — I48.92 ATRIAL FLUTTER WITH RAPID VENTRICULAR RESPONSE (HCC): Status: ACTIVE | Noted: 2024-04-05

## 2024-04-05 LAB
ALBUMIN SERPL BCG-MCNC: 4.3 G/DL (ref 3.5–5.1)
ALP SERPL-CCNC: 90 U/L (ref 38–126)
ALT SERPL W/O P-5'-P-CCNC: < 5 U/L (ref 11–66)
AMPHETAMINES UR QL SCN: NEGATIVE
ANION GAP SERPL CALC-SCNC: 26 MEQ/L (ref 8–16)
APTT PPP: 28 SECONDS (ref 22–38)
APTT PPP: 29.3 SECONDS (ref 22–38)
AST SERPL-CCNC: 21 U/L (ref 5–40)
B-OH-BUTYR SERPL-MSCNC: 32.99 MG/DL (ref 0.2–2.81)
BACTERIA: ABNORMAL
BARBITURATES UR QL SCN: NEGATIVE
BASOPHILS ABSOLUTE: 0 THOU/MM3 (ref 0–0.1)
BASOPHILS NFR BLD AUTO: 0.2 %
BENZODIAZ UR QL SCN: NEGATIVE
BILIRUB CONJ SERPL-MCNC: < 0.2 MG/DL (ref 0–0.3)
BILIRUB SERPL-MCNC: 0.6 MG/DL (ref 0.3–1.2)
BILIRUB UR QL STRIP: NEGATIVE
BUN SERPL-MCNC: 13 MG/DL (ref 7–22)
BZE UR QL SCN: NEGATIVE
CALCIUM SERPL-MCNC: 8.4 MG/DL (ref 8.5–10.5)
CANNABINOIDS UR QL SCN: NEGATIVE
CASTS #/AREA URNS LPF: ABNORMAL /LPF
CASTS #/AREA URNS LPF: ABNORMAL /LPF
CHARACTER UR: CLEAR
CHARCOAL URNS QL MICRO: ABNORMAL
CHLORIDE SERPL-SCNC: 94 MEQ/L (ref 98–111)
CHOLEST SERPL-MCNC: 184 MG/DL (ref 100–199)
CO2 SERPL-SCNC: 19 MEQ/L (ref 23–33)
COLOR UR: YELLOW
CREAT SERPL-MCNC: 0.5 MG/DL (ref 0.4–1.2)
CRYSTALS URNS QL MICRO: ABNORMAL
DEPRECATED MEAN GLUCOSE BLD GHB EST-ACNC: 129 MG/DL (ref 70–126)
DEPRECATED RDW RBC AUTO: 53.7 FL (ref 35–45)
EOSINOPHIL NFR BLD AUTO: 0 %
EOSINOPHILS ABSOLUTE: 0 THOU/MM3 (ref 0–0.4)
EPITHELIAL CELLS, UA: ABNORMAL /HPF
ERYTHROCYTE [DISTWIDTH] IN BLOOD BY AUTOMATED COUNT: 15.6 % (ref 11.5–14.5)
ETHANOL SERPL-MCNC: < 0.01 %
FENTANYL: NEGATIVE
GFR SERPL CREATININE-BSD FRML MDRD: > 90 ML/MIN/1.73M2
GLUCOSE BLD STRIP.AUTO-MCNC: 128 MG/DL (ref 70–108)
GLUCOSE BLD STRIP.AUTO-MCNC: 98 MG/DL (ref 70–108)
GLUCOSE BLD STRIP.AUTO-MCNC: 99 MG/DL (ref 70–108)
GLUCOSE SERPL-MCNC: 120 MG/DL (ref 70–108)
GLUCOSE UR QL STRIP.AUTO: NEGATIVE MG/DL
HBA1C MFR BLD HPLC: 6.3 % (ref 4.4–6.4)
HCT VFR BLD AUTO: 48.2 % (ref 42–52)
HDLC SERPL-MCNC: 104 MG/DL
HEPARIN UNFRACTIONATED: 0.11 U/ML (ref 0.3–0.7)
HEPARIN UNFRACTIONATED: 0.22 U/ML (ref 0.3–0.7)
HGB BLD-MCNC: 16.6 GM/DL (ref 14–18)
HGB UR QL STRIP.AUTO: NEGATIVE
IMM GRANULOCYTES # BLD AUTO: 0.04 THOU/MM3 (ref 0–0.07)
IMM GRANULOCYTES NFR BLD AUTO: 0.3 %
INR PPP: 0.93 (ref 0.85–1.13)
INR PPP: 0.96 (ref 0.85–1.13)
KETONES UR QL STRIP.AUTO: >= 160
LACTIC ACID, SEPSIS: 1.9 MMOL/L (ref 0.5–1.9)
LACTIC ACID, SEPSIS: 2.8 MMOL/L (ref 0.5–1.9)
LDLC SERPL CALC-MCNC: 65 MG/DL
LEUKOCYTE ESTERASE UR QL STRIP.AUTO: NEGATIVE
LYMPHOCYTES ABSOLUTE: 0.2 THOU/MM3 (ref 1–4.8)
LYMPHOCYTES NFR BLD AUTO: 2 %
MAGNESIUM SERPL-MCNC: 2.2 MG/DL (ref 1.6–2.4)
MCH RBC QN AUTO: 32.7 PG (ref 26–33)
MCHC RBC AUTO-ENTMCNC: 34.4 GM/DL (ref 32.2–35.5)
MCV RBC AUTO: 94.9 FL (ref 80–94)
MONOCYTES ABSOLUTE: 0.4 THOU/MM3 (ref 0.4–1.3)
MONOCYTES NFR BLD AUTO: 3.5 %
NEUTROPHILS NFR BLD AUTO: 94 %
NITRITE UR QL STRIP.AUTO: NEGATIVE
NRBC BLD AUTO-RTO: 0 /100 WBC
OPIATES UR QL SCN: NEGATIVE
OSMOLALITY SERPL CALC.SUM OF ELEC: 278.8 MOSMOL/KG (ref 275–300)
OXYCODONE: NEGATIVE
PCP UR QL SCN: NEGATIVE
PH UR STRIP.AUTO: 5.5 [PH] (ref 5–9)
PLATELET # BLD AUTO: 132 THOU/MM3 (ref 130–400)
PMV BLD AUTO: 8.7 FL (ref 9.4–12.4)
POTASSIUM SERPL-SCNC: 4.3 MEQ/L (ref 3.5–5.2)
PROCALCITONIN SERPL IA-MCNC: 0.09 NG/ML (ref 0.01–0.09)
PROT SERPL-MCNC: 7.4 G/DL (ref 6.1–8)
PROT UR STRIP.AUTO-MCNC: 30 MG/DL
RBC # BLD AUTO: 5.08 MILL/MM3 (ref 4.7–6.1)
RBC #/AREA URNS HPF: ABNORMAL /HPF
RENAL EPI CELLS #/AREA URNS HPF: ABNORMAL /[HPF]
SEGMENTED NEUTROPHILS ABSOLUTE COUNT: 11 THOU/MM3 (ref 1.8–7.7)
SODIUM SERPL-SCNC: 139 MEQ/L (ref 135–145)
SPECIFIC GRAVITY UA: > 1.03 (ref 1–1.03)
T4 FREE SERPL-MCNC: 1.15 NG/DL (ref 0.93–1.68)
TRIGL SERPL-MCNC: 76 MG/DL (ref 0–199)
TROPONIN, HIGH SENSITIVITY: 16 NG/L (ref 0–12)
TROPONIN, HIGH SENSITIVITY: 17 NG/L (ref 0–12)
TROPONIN, HIGH SENSITIVITY: 20 NG/L (ref 0–12)
TSH SERPL DL<=0.005 MIU/L-ACNC: 1.08 UIU/ML (ref 0.4–4.2)
UROBILINOGEN, URINE: 0.2 EU/DL (ref 0–1)
WBC # BLD AUTO: 11.7 THOU/MM3 (ref 4.8–10.8)
WBC #/AREA URNS HPF: ABNORMAL /HPF
YEAST LIKE FUNGI URNS QL MICRO: ABNORMAL

## 2024-04-05 PROCEDURE — 82077 ASSAY SPEC XCP UR&BREATH IA: CPT

## 2024-04-05 PROCEDURE — 70498 CT ANGIOGRAPHY NECK: CPT

## 2024-04-05 PROCEDURE — 82010 KETONE BODYS QUAN: CPT

## 2024-04-05 PROCEDURE — 84484 ASSAY OF TROPONIN QUANT: CPT

## 2024-04-05 PROCEDURE — 99285 EMERGENCY DEPT VISIT HI MDM: CPT

## 2024-04-05 PROCEDURE — 6370000000 HC RX 637 (ALT 250 FOR IP): Performed by: PHYSICIAN ASSISTANT

## 2024-04-05 PROCEDURE — 84443 ASSAY THYROID STIM HORMONE: CPT

## 2024-04-05 PROCEDURE — 83036 HEMOGLOBIN GLYCOSYLATED A1C: CPT

## 2024-04-05 PROCEDURE — 6360000002 HC RX W HCPCS: Performed by: EMERGENCY MEDICINE

## 2024-04-05 PROCEDURE — 83605 ASSAY OF LACTIC ACID: CPT

## 2024-04-05 PROCEDURE — 6360000004 HC RX CONTRAST MEDICATION: Performed by: PHYSICIAN ASSISTANT

## 2024-04-05 PROCEDURE — 6360000002 HC RX W HCPCS: Performed by: NURSE PRACTITIONER

## 2024-04-05 PROCEDURE — 6370000000 HC RX 637 (ALT 250 FOR IP): Performed by: STUDENT IN AN ORGANIZED HEALTH CARE EDUCATION/TRAINING PROGRAM

## 2024-04-05 PROCEDURE — 71045 X-RAY EXAM CHEST 1 VIEW: CPT

## 2024-04-05 PROCEDURE — 6360000002 HC RX W HCPCS: Performed by: STUDENT IN AN ORGANIZED HEALTH CARE EDUCATION/TRAINING PROGRAM

## 2024-04-05 PROCEDURE — 99223 1ST HOSP IP/OBS HIGH 75: CPT

## 2024-04-05 PROCEDURE — 84145 PROCALCITONIN (PCT): CPT

## 2024-04-05 PROCEDURE — 6360000002 HC RX W HCPCS: Performed by: PHYSICIAN ASSISTANT

## 2024-04-05 PROCEDURE — 80061 LIPID PANEL: CPT

## 2024-04-05 PROCEDURE — 85025 COMPLETE CBC W/AUTO DIFF WBC: CPT

## 2024-04-05 PROCEDURE — 2140000000 HC CCU INTERMEDIATE R&B

## 2024-04-05 PROCEDURE — 80307 DRUG TEST PRSMV CHEM ANLYZR: CPT

## 2024-04-05 PROCEDURE — 2580000003 HC RX 258: Performed by: NURSE PRACTITIONER

## 2024-04-05 PROCEDURE — 81001 URINALYSIS AUTO W/SCOPE: CPT

## 2024-04-05 PROCEDURE — 84439 ASSAY OF FREE THYROXINE: CPT

## 2024-04-05 PROCEDURE — 93005 ELECTROCARDIOGRAM TRACING: CPT | Performed by: PHYSICIAN ASSISTANT

## 2024-04-05 PROCEDURE — 93005 ELECTROCARDIOGRAM TRACING: CPT | Performed by: INTERNAL MEDICINE

## 2024-04-05 PROCEDURE — 82248 BILIRUBIN DIRECT: CPT

## 2024-04-05 PROCEDURE — 2580000003 HC RX 258: Performed by: STUDENT IN AN ORGANIZED HEALTH CARE EDUCATION/TRAINING PROGRAM

## 2024-04-05 PROCEDURE — 80053 COMPREHEN METABOLIC PANEL: CPT

## 2024-04-05 PROCEDURE — 87040 BLOOD CULTURE FOR BACTERIA: CPT

## 2024-04-05 PROCEDURE — 2580000003 HC RX 258: Performed by: PHYSICIAN ASSISTANT

## 2024-04-05 PROCEDURE — 83735 ASSAY OF MAGNESIUM: CPT

## 2024-04-05 PROCEDURE — 85610 PROTHROMBIN TIME: CPT

## 2024-04-05 PROCEDURE — 96365 THER/PROPH/DIAG IV INF INIT: CPT

## 2024-04-05 PROCEDURE — 85730 THROMBOPLASTIN TIME PARTIAL: CPT

## 2024-04-05 PROCEDURE — 70496 CT ANGIOGRAPHY HEAD: CPT

## 2024-04-05 PROCEDURE — 2500000003 HC RX 250 WO HCPCS: Performed by: PHYSICIAN ASSISTANT

## 2024-04-05 PROCEDURE — 96375 TX/PRO/DX INJ NEW DRUG ADDON: CPT

## 2024-04-05 PROCEDURE — 82948 REAGENT STRIP/BLOOD GLUCOSE: CPT

## 2024-04-05 PROCEDURE — 99223 1ST HOSP IP/OBS HIGH 75: CPT | Performed by: INTERNAL MEDICINE

## 2024-04-05 PROCEDURE — 36415 COLL VENOUS BLD VENIPUNCTURE: CPT

## 2024-04-05 PROCEDURE — 70450 CT HEAD/BRAIN W/O DYE: CPT

## 2024-04-05 PROCEDURE — 85520 HEPARIN ASSAY: CPT

## 2024-04-05 RX ORDER — FOLIC ACID 1 MG/1
1 TABLET ORAL DAILY
Status: DISCONTINUED | OUTPATIENT
Start: 2024-04-05 | End: 2024-04-07 | Stop reason: HOSPADM

## 2024-04-05 RX ORDER — PRAVASTATIN SODIUM 40 MG
40 TABLET ORAL DAILY
Status: DISCONTINUED | OUTPATIENT
Start: 2024-04-05 | End: 2024-04-05

## 2024-04-05 RX ORDER — LANOLIN ALCOHOL/MO/W.PET/CERES
100 CREAM (GRAM) TOPICAL DAILY
Status: DISCONTINUED | OUTPATIENT
Start: 2024-04-05 | End: 2024-04-07 | Stop reason: HOSPADM

## 2024-04-05 RX ORDER — MULTIVITAMIN WITH IRON
1 TABLET ORAL DAILY
Status: DISCONTINUED | OUTPATIENT
Start: 2024-04-05 | End: 2024-04-07 | Stop reason: HOSPADM

## 2024-04-05 RX ORDER — SODIUM CHLORIDE 0.9 % (FLUSH) 0.9 %
5-40 SYRINGE (ML) INJECTION PRN
Status: DISCONTINUED | OUTPATIENT
Start: 2024-04-05 | End: 2024-04-07 | Stop reason: HOSPADM

## 2024-04-05 RX ORDER — SODIUM CHLORIDE 0.9 % (FLUSH) 0.9 %
10 SYRINGE (ML) INJECTION PRN
Status: DISCONTINUED | OUTPATIENT
Start: 2024-04-05 | End: 2024-04-05 | Stop reason: SDUPTHER

## 2024-04-05 RX ORDER — BUDESONIDE AND FORMOTEROL FUMARATE DIHYDRATE 80; 4.5 UG/1; UG/1
2 AEROSOL RESPIRATORY (INHALATION)
Status: DISCONTINUED | OUTPATIENT
Start: 2024-04-05 | End: 2024-04-06 | Stop reason: CLARIF

## 2024-04-05 RX ORDER — MAGNESIUM SULFATE IN WATER 40 MG/ML
2000 INJECTION, SOLUTION INTRAVENOUS PRN
Status: DISCONTINUED | OUTPATIENT
Start: 2024-04-05 | End: 2024-04-07 | Stop reason: HOSPADM

## 2024-04-05 RX ORDER — INSULIN LISPRO 100 [IU]/ML
0-16 INJECTION, SOLUTION INTRAVENOUS; SUBCUTANEOUS
Status: DISCONTINUED | OUTPATIENT
Start: 2024-04-05 | End: 2024-04-07 | Stop reason: HOSPADM

## 2024-04-05 RX ORDER — ACETAMINOPHEN 650 MG/1
650 SUPPOSITORY RECTAL EVERY 6 HOURS PRN
Status: DISCONTINUED | OUTPATIENT
Start: 2024-04-05 | End: 2024-04-07 | Stop reason: HOSPADM

## 2024-04-05 RX ORDER — ATORVASTATIN CALCIUM 40 MG/1
40 TABLET, FILM COATED ORAL NIGHTLY
Status: DISCONTINUED | OUTPATIENT
Start: 2024-04-05 | End: 2024-04-07 | Stop reason: HOSPADM

## 2024-04-05 RX ORDER — PHENOBARBITAL 32.4 MG/1
32.4 TABLET ORAL 2 TIMES DAILY
Status: DISCONTINUED | OUTPATIENT
Start: 2024-04-07 | End: 2024-04-05 | Stop reason: SDUPTHER

## 2024-04-05 RX ORDER — CLOPIDOGREL BISULFATE 75 MG/1
75 TABLET ORAL DAILY
Status: DISCONTINUED | OUTPATIENT
Start: 2024-04-05 | End: 2024-04-07 | Stop reason: HOSPADM

## 2024-04-05 RX ORDER — SODIUM CHLORIDE 9 MG/ML
INJECTION, SOLUTION INTRAVENOUS PRN
Status: DISCONTINUED | OUTPATIENT
Start: 2024-04-05 | End: 2024-04-05 | Stop reason: SDUPTHER

## 2024-04-05 RX ORDER — POTASSIUM CHLORIDE 7.45 MG/ML
10 INJECTION INTRAVENOUS PRN
Status: DISCONTINUED | OUTPATIENT
Start: 2024-04-05 | End: 2024-04-07 | Stop reason: HOSPADM

## 2024-04-05 RX ORDER — SODIUM CHLORIDE 9 MG/ML
INJECTION, SOLUTION INTRAVENOUS PRN
Status: DISCONTINUED | OUTPATIENT
Start: 2024-04-05 | End: 2024-04-07 | Stop reason: HOSPADM

## 2024-04-05 RX ORDER — SODIUM CHLORIDE 0.9 % (FLUSH) 0.9 %
5-40 SYRINGE (ML) INJECTION EVERY 12 HOURS SCHEDULED
Status: DISCONTINUED | OUTPATIENT
Start: 2024-04-05 | End: 2024-04-07 | Stop reason: HOSPADM

## 2024-04-05 RX ORDER — MAGNESIUM SULFATE IN WATER 40 MG/ML
2000 INJECTION, SOLUTION INTRAVENOUS ONCE
Status: COMPLETED | OUTPATIENT
Start: 2024-04-05 | End: 2024-04-05

## 2024-04-05 RX ORDER — ALBUTEROL SULFATE 90 UG/1
2 AEROSOL, METERED RESPIRATORY (INHALATION) EVERY 4 HOURS PRN
Status: DISCONTINUED | OUTPATIENT
Start: 2024-04-05 | End: 2024-04-07 | Stop reason: HOSPADM

## 2024-04-05 RX ORDER — DEXTROSE MONOHYDRATE 100 MG/ML
INJECTION, SOLUTION INTRAVENOUS CONTINUOUS PRN
Status: DISCONTINUED | OUTPATIENT
Start: 2024-04-05 | End: 2024-04-07 | Stop reason: HOSPADM

## 2024-04-05 RX ORDER — METOPROLOL TARTRATE 50 MG/1
50 TABLET, FILM COATED ORAL 2 TIMES DAILY
Status: DISCONTINUED | OUTPATIENT
Start: 2024-04-05 | End: 2024-04-07 | Stop reason: HOSPADM

## 2024-04-05 RX ORDER — HEPARIN SODIUM 10000 [USP'U]/100ML
5-30 INJECTION, SOLUTION INTRAVENOUS CONTINUOUS
Status: DISCONTINUED | OUTPATIENT
Start: 2024-04-05 | End: 2024-04-06

## 2024-04-05 RX ORDER — SODIUM CHLORIDE 9 MG/ML
INJECTION, SOLUTION INTRAVENOUS CONTINUOUS
Status: ACTIVE | OUTPATIENT
Start: 2024-04-05 | End: 2024-04-06

## 2024-04-05 RX ORDER — POLYETHYLENE GLYCOL 3350 17 G/17G
17 POWDER, FOR SOLUTION ORAL DAILY PRN
Status: DISCONTINUED | OUTPATIENT
Start: 2024-04-05 | End: 2024-04-07 | Stop reason: HOSPADM

## 2024-04-05 RX ORDER — 0.9 % SODIUM CHLORIDE 0.9 %
1000 INTRAVENOUS SOLUTION INTRAVENOUS ONCE
Status: COMPLETED | OUTPATIENT
Start: 2024-04-05 | End: 2024-04-05

## 2024-04-05 RX ORDER — MECLIZINE HCL 25MG 25 MG/1
25 TABLET, CHEWABLE ORAL ONCE
Status: COMPLETED | OUTPATIENT
Start: 2024-04-05 | End: 2024-04-05

## 2024-04-05 RX ORDER — DILTIAZEM HYDROCHLORIDE 5 MG/ML
10 INJECTION INTRAVENOUS ONCE
Status: COMPLETED | OUTPATIENT
Start: 2024-04-05 | End: 2024-04-05

## 2024-04-05 RX ORDER — SODIUM CHLORIDE 0.9 % (FLUSH) 0.9 %
5-40 SYRINGE (ML) INJECTION EVERY 12 HOURS SCHEDULED
Status: DISCONTINUED | OUTPATIENT
Start: 2024-04-05 | End: 2024-04-05 | Stop reason: SDUPTHER

## 2024-04-05 RX ORDER — HEPARIN SODIUM 1000 [USP'U]/ML
2000 INJECTION, SOLUTION INTRAVENOUS; SUBCUTANEOUS PRN
Status: DISCONTINUED | OUTPATIENT
Start: 2024-04-05 | End: 2024-04-07

## 2024-04-05 RX ORDER — POTASSIUM CHLORIDE 20 MEQ/1
40 TABLET, EXTENDED RELEASE ORAL PRN
Status: DISCONTINUED | OUTPATIENT
Start: 2024-04-05 | End: 2024-04-07 | Stop reason: HOSPADM

## 2024-04-05 RX ORDER — ONDANSETRON 2 MG/ML
4 INJECTION INTRAMUSCULAR; INTRAVENOUS ONCE
Status: COMPLETED | OUTPATIENT
Start: 2024-04-05 | End: 2024-04-05

## 2024-04-05 RX ORDER — HEPARIN SODIUM 1000 [USP'U]/ML
4000 INJECTION, SOLUTION INTRAVENOUS; SUBCUTANEOUS PRN
Status: DISCONTINUED | OUTPATIENT
Start: 2024-04-05 | End: 2024-04-07

## 2024-04-05 RX ORDER — LORAZEPAM 2 MG/ML
2 INJECTION INTRAMUSCULAR ONCE
Status: DISCONTINUED | OUTPATIENT
Start: 2024-04-05 | End: 2024-04-06

## 2024-04-05 RX ORDER — ONDANSETRON 2 MG/ML
4 INJECTION INTRAMUSCULAR; INTRAVENOUS EVERY 6 HOURS PRN
Status: DISCONTINUED | OUTPATIENT
Start: 2024-04-05 | End: 2024-04-07 | Stop reason: HOSPADM

## 2024-04-05 RX ORDER — INSULIN LISPRO 100 [IU]/ML
0-4 INJECTION, SOLUTION INTRAVENOUS; SUBCUTANEOUS NIGHTLY
Status: DISCONTINUED | OUTPATIENT
Start: 2024-04-05 | End: 2024-04-07 | Stop reason: HOSPADM

## 2024-04-05 RX ORDER — HEPARIN SODIUM 1000 [USP'U]/ML
4000 INJECTION, SOLUTION INTRAVENOUS; SUBCUTANEOUS ONCE
Status: COMPLETED | OUTPATIENT
Start: 2024-04-05 | End: 2024-04-05

## 2024-04-05 RX ORDER — LORAZEPAM 2 MG/ML
1 INJECTION INTRAMUSCULAR ONCE
Status: COMPLETED | OUTPATIENT
Start: 2024-04-05 | End: 2024-04-05

## 2024-04-05 RX ORDER — PHENOBARBITAL 32.4 MG/1
32.4 TABLET ORAL 2 TIMES DAILY
Status: DISCONTINUED | OUTPATIENT
Start: 2024-04-07 | End: 2024-04-07 | Stop reason: HOSPADM

## 2024-04-05 RX ORDER — ACETAMINOPHEN 325 MG/1
650 TABLET ORAL EVERY 6 HOURS PRN
Status: DISCONTINUED | OUTPATIENT
Start: 2024-04-05 | End: 2024-04-07 | Stop reason: HOSPADM

## 2024-04-05 RX ORDER — GLUCAGON 1 MG/ML
1 KIT INJECTION PRN
Status: DISCONTINUED | OUTPATIENT
Start: 2024-04-05 | End: 2024-04-07 | Stop reason: HOSPADM

## 2024-04-05 RX ORDER — PHENOBARBITAL 32.4 MG/1
64.8 TABLET ORAL 2 TIMES DAILY
Status: DISCONTINUED | OUTPATIENT
Start: 2024-04-06 | End: 2024-04-05 | Stop reason: SDUPTHER

## 2024-04-05 RX ORDER — ASPIRIN 81 MG/1
81 TABLET, CHEWABLE ORAL DAILY
Status: DISCONTINUED | OUTPATIENT
Start: 2024-04-05 | End: 2024-04-05

## 2024-04-05 RX ORDER — PHENOBARBITAL 32.4 MG/1
64.8 TABLET ORAL 2 TIMES DAILY
Status: COMPLETED | OUTPATIENT
Start: 2024-04-06 | End: 2024-04-06

## 2024-04-05 RX ORDER — ONDANSETRON 4 MG/1
4 TABLET, ORALLY DISINTEGRATING ORAL EVERY 8 HOURS PRN
Status: DISCONTINUED | OUTPATIENT
Start: 2024-04-05 | End: 2024-04-07 | Stop reason: HOSPADM

## 2024-04-05 RX ADMIN — DILTIAZEM HYDROCHLORIDE 15 MG/HR: 5 INJECTION INTRAVENOUS at 20:02

## 2024-04-05 RX ADMIN — PHENOBARBITAL SODIUM 300.95 MG: 65 INJECTION INTRAMUSCULAR at 18:07

## 2024-04-05 RX ADMIN — Medication 100 MG: at 13:47

## 2024-04-05 RX ADMIN — METOPROLOL TARTRATE 50 MG: 50 TABLET, FILM COATED ORAL at 20:03

## 2024-04-05 RX ADMIN — SODIUM CHLORIDE: 9 INJECTION, SOLUTION INTRAVENOUS at 13:09

## 2024-04-05 RX ADMIN — MECLIZINE HYDROCHLORIDE 25 MG: 25 TABLET, CHEWABLE ORAL at 07:50

## 2024-04-05 RX ADMIN — PHENOBARBITAL SODIUM 300.95 MG: 65 INJECTION INTRAMUSCULAR at 14:01

## 2024-04-05 RX ADMIN — LORAZEPAM 1 MG: 2 INJECTION INTRAMUSCULAR; INTRAVENOUS at 08:58

## 2024-04-05 RX ADMIN — MAGNESIUM SULFATE HEPTAHYDRATE 2000 MG: 40 INJECTION, SOLUTION INTRAVENOUS at 15:35

## 2024-04-05 RX ADMIN — ONDANSETRON 4 MG: 2 INJECTION INTRAMUSCULAR; INTRAVENOUS at 07:50

## 2024-04-05 RX ADMIN — IOPAMIDOL 80 ML: 755 INJECTION, SOLUTION INTRAVENOUS at 08:30

## 2024-04-05 RX ADMIN — HEPARIN SODIUM 2000 UNITS: 1000 INJECTION INTRAVENOUS; SUBCUTANEOUS at 21:13

## 2024-04-05 RX ADMIN — ATORVASTATIN CALCIUM 40 MG: 40 TABLET, FILM COATED ORAL at 20:03

## 2024-04-05 RX ADMIN — FOLIC ACID 1 MG: 1 TABLET ORAL at 13:47

## 2024-04-05 RX ADMIN — SODIUM CHLORIDE, PRESERVATIVE FREE 10 ML: 5 INJECTION INTRAVENOUS at 13:03

## 2024-04-05 RX ADMIN — METOPROLOL TARTRATE 50 MG: 50 TABLET, FILM COATED ORAL at 13:47

## 2024-04-05 RX ADMIN — DILTIAZEM HYDROCHLORIDE 10 MG: 5 INJECTION, SOLUTION INTRAVENOUS at 09:24

## 2024-04-05 RX ADMIN — HEPARIN SODIUM 11 UNITS/KG/HR: 10000 INJECTION, SOLUTION INTRAVENOUS at 12:59

## 2024-04-05 RX ADMIN — Medication 1 TABLET: at 13:47

## 2024-04-05 RX ADMIN — CLOPIDOGREL BISULFATE 75 MG: 75 TABLET ORAL at 13:49

## 2024-04-05 RX ADMIN — PHENOBARBITAL SODIUM 300.95 MG: 65 INJECTION INTRAMUSCULAR at 23:02

## 2024-04-05 RX ADMIN — SODIUM CHLORIDE 1000 ML: 9 INJECTION, SOLUTION INTRAVENOUS at 07:51

## 2024-04-05 RX ADMIN — DILTIAZEM HYDROCHLORIDE 5 MG/HR: 5 INJECTION INTRAVENOUS at 09:47

## 2024-04-05 RX ADMIN — HEPARIN SODIUM 4000 UNITS: 1000 INJECTION INTRAVENOUS; SUBCUTANEOUS at 13:00

## 2024-04-05 ASSESSMENT — PAIN SCALES - GENERAL
PAINLEVEL_OUTOF10: 10
PAINLEVEL_OUTOF10: 0
PAINLEVEL_OUTOF10: 10

## 2024-04-05 ASSESSMENT — PAIN - FUNCTIONAL ASSESSMENT
PAIN_FUNCTIONAL_ASSESSMENT: 0-10

## 2024-04-05 ASSESSMENT — PAIN DESCRIPTION - LOCATION
LOCATION: FACE

## 2024-04-05 NOTE — ED PROVIDER NOTES
[facility]      EMERGENCY MEDICINE     Pt Name: Carl M Ortolani  MRN: [id number]  Birthdate 1965  Date of evaluation: 4/5/2024  Provider: MERLE Levine    CHIEF COMPLAINT       Chief Complaint   Patient presents with    Dizziness     HISTORY OF PRESENT ILLNESS   Carl M Ortolani is a pleasant 58 y.o. male who presents to the emergency department from from home, by private vehicle for evaluation of vertigo for 2 days.  The patient states that he also has double vision intermittently.  He states is new for him is also had tinnitus out of his left ear does not new and he also has muffled hearing out of both ears that is new.  He states that he is had no weakness in his arms or legs. No NT. .        PASTMEDICAL HISTORY     Past Medical History:   Diagnosis Date    Asthma     Diabetes mellitus ([facility])     GERD (gastroesophageal reflux disease)     Hyperlipidemia     Hypertension     Seizures ([facility])     Sleep apnea     does not use cpap    Smoker        Patient Active Problem List   Diagnosis Code    Hyponatremia E87.1    Sepsis ([facility]) A41.9    Atrial fibrillation with RVR ([facility]) I48.91    Atrial flutter with rapid ventricular response ([facility]) I48.92     SURGICAL HISTORY       Past Surgical History:   Procedure Laterality Date    COLONOSCOPY         CURRENT MEDICATIONS       Current Discharge Medication List        CONTINUE these medications which have NOT CHANGED    Details   loratadine (CLARITIN) 10 MG capsule Take 1 capsule by mouth daily as needed (congestion)      albuterol sulfate HFA (PROVENTIL;VENTOLIN;PROAIR) 108 (90 Base) MCG/ACT inhaler Inhale 2 puffs into the lungs every 4 hours as needed for Wheezing or Shortness of Breath  Qty: 18 g, Refills: 0      azithromycin (ZITHROMAX Z-HELADIO) 250 MG tablet Take 2 tablets (500 mg) on Day 1, and then take 1 tablet (250 mg) on days 2 through 5.  Qty: 1 packet, Refills: 0    Associated Diagnoses: Bronchitis      metoprolol tartrate (LOPRESSOR) 50 MG tablet

## 2024-04-05 NOTE — CONSULTS
[facility]'s  Consult    Patient's Name/Date of Birth: Carl M Ortolani / 1965 (58 y.o.)    Date: April 5, 2024     Referring Provider: Cesar Calvillo MD    CHIEF COMPLAINT: Vertigo    Reason consultation:- atrial flutter with RVR in a pat admitted with ETOH withdrawal      HPI:   History obtained from the patient at the bedside in 3 B38.  This is a 58-year-old male who presented to [facility] ED after 48 hours of new onset \"vertigo.\"  Patient describes his symptoms as new onset dizziness, lightheadedness, nausea, with difficulty with seeing far, \"room spinning\", and difficulty with walking for the last 48 hours.  He states that he woke up with the symptoms.  Additional review of systems since arrival to the hospital are positive for substernal chest pressure that radiates into the head, difficulty speaking, and tremor of the upper extremities bilaterally.  Additional review of systems is positive for shortness of breath at rest and with exertion, cough, sputum production (patient is unsure of color) he endorses having an episode of such symptoms 4 years ago.  He denies any falls, loss of consciousness, trauma, double vision, photophobia, phonophobia, dysphagia, vomiting, chest pain, paresthesias of the upper and lower extremities, hemoptysis, hematemesis, hematochezia, melena, constipation, diarrhea, change in urination, dysuria, arm/leg swelling.    The patient endorses a paternal family history of hypertension.  He denies any family history specifically for coronary artery disease.  He does not know his maternal family history.  Patient admits to drinking 1/5 of alcohol a week and drinking 4 out of 7 days of the week..  He states that his last drink was Thursday afternoon after work.  He denies any history of blackouts, withdrawal seizures, hospitalizations for withdrawals.  He admits to smoking a pack per day for many years.  He denies use of chewing tobacco, electronic cigarettes,

## 2024-04-05 NOTE — ED NOTES
MRI screening completed with pt.   
Pt medicated at MRI due to not being able to lay flat.   
Pt refusing MRI due to not being able to breathe. MERLE Kaufman at bedside.  
Pt resting on cot. Pt states his breathing has improved.  
Pt transported to [facility] in stable condition. Floor contacted before transport,spoke to April.   
Received report from NORA Guillermo. PT resting in bed, pt denies any needs. Call light within reach. Updated on bed assignment at this time.   
   does not use cpap           Electronically signed by Ana Maria Bliss RN on 4/5/2024 at 9:39 AM

## 2024-04-05 NOTE — H&P
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   3. Atherosclerotic calcification in the cavernous segments of both internal carotid arteries.         **This report has been created using voice recognition software. It may contain minor errors which are inherent in voice recognition technology.**      Final report electronically signed by DR ULI NYE on 4/5/2024 8:47 AM      CTA HEAD W WO CONTRAST   Final Result       1. Punctate calcifications involving the right the left carotid bifurcations. No significant hemodynamic stenosis in the right and left common and internal carotid arteries.   2. There is antegrade flow in the right and left vertebral arteries. There is punctate calcification just distal to the origin of the left vertebral artery.   3. There is atherosclerotic calcification in the aortic arch and at the origins of the brachiocephalic, left common carotid and left subclavian arteries.   4. There is atherosclerotic calcification in the cavernous segments of both internal carotid arteries. There is no evidence of severe stenosis.   5. There is a dominant left and smaller right vertebral artery with antegrade flow bilaterally. There is punctate calcification in the distal left vertebral artery.   6. Otherwise negative CTA brain.   7. Diffusion MRI scan of the brain would be helpful for better evaluation this patient.               **This report has been created using voice recognition software. It may contain minor errors which are inherent in voice recognition technology.**      Final report electronically signed by DR ULI NYE on 4/5/2024 8:57 AM      CTA NECK W WO CONTRAST   Final Result       1. Punctate calcifications involving the right the left carotid bifurcations. No significant hemodynamic stenosis in the right and left common and internal carotid arteries.   2. There is antegrade flow in the right and left vertebral arteries. There is punctate

## 2024-04-05 NOTE — ED TRIAGE NOTES
Pt presents to the ED via EMS for dizziness, shaking and face pain x2 days. Pt states he believes it is vertigo.

## 2024-04-05 NOTE — CONSULTS
Neurology Consult Note    Date:4/5/2024       Room:[facility]/038-A  Patient Name:Carl M Ortolani     YOB: 1965     Age:58 y.o.    Requesting Physician: Cesar Calvillo MD     Reason for Consult:  Evaluate for concern for stroke, cerebellar? - not a tpa candidate      Chief Complaint:   Chief Complaint   Patient presents with    Dizziness       Subjective     Carl M Ortolani is a 58 y.o. male with a history of smoker, sleep apnea, seizures, hypertension, hyperlipidemia, diabetes, GERD, asthma who presented to [facility] ED today for the evaluation of vertigo for 2 days.  He describes the vertigo as dizziness, shaking in bilateral upper extremities, facial pain, and diaphoresis.  He also admits to some intermittent diplopia.  Patient states that he got extremely short of breath, felt diuretic, and felt like he was unable to breathe.  Patient attempted to get MRI while in the ED, but when he laid flat he could not breathe.  Patient subsequently was found to go into new onset atrial fibrillation with RVR.  Patient started on the heparin drip, Plavix, Lipitor per primary team.  Patient admits to chronic alcohol use stating his last beverage was within the last 24 hours.  Patient denies any known history of stroke in the past. He was not on any antiplatelets or anticoagulation prior to admission.  His only complaint on examination is shortness of breath.  Patient has some tremoring of bilateral upper extremities.  CT head negative for any acute intracranial abnormalities.  CTA head and neck negative for any hemodynamically significant stenosis.  MRI brain without ordered for further evaluation.  A1c 6.3.  LDL 65.     Review of Systems   Review of Systems   Constitutional:  Negative for chills and fever.   HENT:  Negative for rhinorrhea and sore throat.    Eyes:  Positive for visual disturbance.   Respiratory:  Positive for shortness of breath. Negative for cough.    Cardiovascular:  Positive for chest pain.

## 2024-04-06 ENCOUNTER — APPOINTMENT (OUTPATIENT)
Dept: MRI IMAGING | Age: 59
DRG: 309 | End: 2024-04-06
Payer: COMMERCIAL

## 2024-04-06 ENCOUNTER — APPOINTMENT (OUTPATIENT)
Age: 59
DRG: 309 | End: 2024-04-06
Payer: COMMERCIAL

## 2024-04-06 PROBLEM — R42 DIZZINESS: Status: ACTIVE | Noted: 2024-04-06

## 2024-04-06 LAB
ANION GAP SERPL CALC-SCNC: 16 MEQ/L (ref 8–16)
BUN SERPL-MCNC: 11 MG/DL (ref 7–22)
CALCIUM SERPL-MCNC: 8 MG/DL (ref 8.5–10.5)
CHLORIDE SERPL-SCNC: 98 MEQ/L (ref 98–111)
CO2 SERPL-SCNC: 21 MEQ/L (ref 23–33)
CREAT SERPL-MCNC: 0.4 MG/DL (ref 0.4–1.2)
DEPRECATED RDW RBC AUTO: 54.6 FL (ref 35–45)
ECHO AV CUSP MM: 1.7 CM
ECHO AV PEAK GRADIENT: 8 MMHG
ECHO AV PEAK VELOCITY: 1.4 M/S
ECHO AV VELOCITY RATIO: 0.86
ECHO BSA: 2.15 M2
ECHO EST RA PRESSURE: 5 MMHG
ECHO LA DIAMETER INDEX: 1.6 CM/M2
ECHO LA DIAMETER: 3.4 CM
ECHO LV E' LATERAL VELOCITY: 11 CM/S
ECHO LV E' SEPTAL VELOCITY: 6 CM/S
ECHO LV FRACTIONAL SHORTENING: 27 % (ref 28–44)
ECHO LV INTERNAL DIMENSION DIASTOLE INDEX: 2.45 CM/M2
ECHO LV INTERNAL DIMENSION DIASTOLIC: 5.2 CM (ref 4.2–5.9)
ECHO LV INTERNAL DIMENSION SYSTOLIC INDEX: 1.79 CM/M2
ECHO LV INTERNAL DIMENSION SYSTOLIC: 3.8 CM
ECHO LV ISOVOLUMETRIC RELAXATION TIME (IVRT): 85 MS
ECHO LV IVSD: 0.9 CM (ref 0.6–1)
ECHO LV MASS 2D: 156.9 G (ref 88–224)
ECHO LV MASS INDEX 2D: 74 G/M2 (ref 49–115)
ECHO LV POSTERIOR WALL DIASTOLIC: 0.8 CM (ref 0.6–1)
ECHO LV RELATIVE WALL THICKNESS RATIO: 0.31
ECHO LVOT PEAK GRADIENT: 6 MMHG
ECHO LVOT PEAK VELOCITY: 1.2 M/S
ECHO MV A VELOCITY: 0.48 M/S
ECHO MV E DECELERATION TIME (DT): 162 MS
ECHO MV E VELOCITY: 1.12 M/S
ECHO MV E/A RATIO: 2.33
ECHO MV E/E' LATERAL: 10.18
ECHO MV E/E' RATIO (AVERAGED): 14.42
ECHO MV REGURGITANT PEAK GRADIENT: 31 MMHG
ECHO MV REGURGITANT PEAK VELOCITY: 2.8 M/S
ECHO PV MAX VELOCITY: 0.8 M/S
ECHO PV PEAK GRADIENT: 2 MMHG
ECHO RIGHT VENTRICULAR SYSTOLIC PRESSURE (RVSP): 32 MMHG
ECHO RV INTERNAL DIMENSION: 2.5 CM
ECHO TV E WAVE: 0.8 M/S
ECHO TV REGURGITANT MAX VELOCITY: 2.58 M/S
ECHO TV REGURGITANT PEAK GRADIENT: 27 MMHG
ERYTHROCYTE [DISTWIDTH] IN BLOOD BY AUTOMATED COUNT: 15.6 % (ref 11.5–14.5)
GFR SERPL CREATININE-BSD FRML MDRD: > 90 ML/MIN/1.73M2
GLUCOSE BLD STRIP.AUTO-MCNC: 103 MG/DL (ref 70–108)
GLUCOSE BLD STRIP.AUTO-MCNC: 104 MG/DL (ref 70–108)
GLUCOSE BLD STRIP.AUTO-MCNC: 95 MG/DL (ref 70–108)
GLUCOSE BLD STRIP.AUTO-MCNC: 98 MG/DL (ref 70–108)
GLUCOSE SERPL-MCNC: 92 MG/DL (ref 70–108)
HCT VFR BLD AUTO: 42.1 % (ref 42–52)
HEPARIN UNFRACTIONATED: 0.28 U/ML (ref 0.3–0.7)
HEPARIN UNFRACTIONATED: 0.33 U/ML (ref 0.3–0.7)
HGB BLD-MCNC: 14.2 GM/DL (ref 14–18)
MCH RBC QN AUTO: 32.6 PG (ref 26–33)
MCHC RBC AUTO-ENTMCNC: 33.7 GM/DL (ref 32.2–35.5)
MCV RBC AUTO: 96.6 FL (ref 80–94)
PLATELET # BLD AUTO: 105 THOU/MM3 (ref 130–400)
PMV BLD AUTO: 9.3 FL (ref 9.4–12.4)
POTASSIUM SERPL-SCNC: 4.5 MEQ/L (ref 3.5–5.2)
RBC # BLD AUTO: 4.36 MILL/MM3 (ref 4.7–6.1)
SODIUM SERPL-SCNC: 135 MEQ/L (ref 135–145)
WBC # BLD AUTO: 6.1 THOU/MM3 (ref 4.8–10.8)

## 2024-04-06 PROCEDURE — 85027 COMPLETE CBC AUTOMATED: CPT

## 2024-04-06 PROCEDURE — 6370000000 HC RX 637 (ALT 250 FOR IP): Performed by: NURSE PRACTITIONER

## 2024-04-06 PROCEDURE — 80048 BASIC METABOLIC PNL TOTAL CA: CPT

## 2024-04-06 PROCEDURE — 82948 REAGENT STRIP/BLOOD GLUCOSE: CPT

## 2024-04-06 PROCEDURE — 2580000003 HC RX 258: Performed by: STUDENT IN AN ORGANIZED HEALTH CARE EDUCATION/TRAINING PROGRAM

## 2024-04-06 PROCEDURE — 93010 ELECTROCARDIOGRAM REPORT: CPT | Performed by: INTERNAL MEDICINE

## 2024-04-06 PROCEDURE — 6370000000 HC RX 637 (ALT 250 FOR IP): Performed by: STUDENT IN AN ORGANIZED HEALTH CARE EDUCATION/TRAINING PROGRAM

## 2024-04-06 PROCEDURE — 94640 AIRWAY INHALATION TREATMENT: CPT

## 2024-04-06 PROCEDURE — 6360000002 HC RX W HCPCS: Performed by: STUDENT IN AN ORGANIZED HEALTH CARE EDUCATION/TRAINING PROGRAM

## 2024-04-06 PROCEDURE — 2140000000 HC CCU INTERMEDIATE R&B

## 2024-04-06 PROCEDURE — 36415 COLL VENOUS BLD VENIPUNCTURE: CPT

## 2024-04-06 PROCEDURE — 70551 MRI BRAIN STEM W/O DYE: CPT

## 2024-04-06 PROCEDURE — 93306 TTE W/DOPPLER COMPLETE: CPT | Performed by: INTERNAL MEDICINE

## 2024-04-06 PROCEDURE — 85520 HEPARIN ASSAY: CPT

## 2024-04-06 PROCEDURE — 93306 TTE W/DOPPLER COMPLETE: CPT

## 2024-04-06 PROCEDURE — 99232 SBSQ HOSP IP/OBS MODERATE 35: CPT | Performed by: NURSE PRACTITIONER

## 2024-04-06 PROCEDURE — 99233 SBSQ HOSP IP/OBS HIGH 50: CPT | Performed by: STUDENT IN AN ORGANIZED HEALTH CARE EDUCATION/TRAINING PROGRAM

## 2024-04-06 RX ORDER — MECLIZINE HCL 25MG 25 MG/1
25 TABLET, CHEWABLE ORAL 3 TIMES DAILY PRN
Status: DISCONTINUED | OUTPATIENT
Start: 2024-04-06 | End: 2024-04-07

## 2024-04-06 RX ORDER — NICOTINE 21 MG/24HR
1 PATCH, TRANSDERMAL 24 HOURS TRANSDERMAL DAILY
Status: DISCONTINUED | OUTPATIENT
Start: 2024-04-06 | End: 2024-04-07 | Stop reason: HOSPADM

## 2024-04-06 RX ORDER — MIDAZOLAM HYDROCHLORIDE 1 MG/ML
2 INJECTION INTRAMUSCULAR; INTRAVENOUS
Status: DISCONTINUED | OUTPATIENT
Start: 2024-04-06 | End: 2024-04-07 | Stop reason: HOSPADM

## 2024-04-06 RX ADMIN — MECLIZINE HYDROCHLORIDE 25 MG: 25 TABLET, CHEWABLE ORAL at 21:19

## 2024-04-06 RX ADMIN — ATORVASTATIN CALCIUM 40 MG: 40 TABLET, FILM COATED ORAL at 21:15

## 2024-04-06 RX ADMIN — MOMETASONE FUROATE AND FORMOTEROL FUMARATE DIHYDRATE 2 PUFF: 100; 5 AEROSOL RESPIRATORY (INHALATION) at 09:00

## 2024-04-06 RX ADMIN — FOLIC ACID 1 MG: 1 TABLET ORAL at 09:07

## 2024-04-06 RX ADMIN — CLOPIDOGREL BISULFATE 75 MG: 75 TABLET ORAL at 09:07

## 2024-04-06 RX ADMIN — MOMETASONE FUROATE AND FORMOTEROL FUMARATE DIHYDRATE 2 PUFF: 100; 5 AEROSOL RESPIRATORY (INHALATION) at 18:27

## 2024-04-06 RX ADMIN — Medication 100 MG: at 09:07

## 2024-04-06 RX ADMIN — PHENOBARBITAL 64.8 MG: 32.4 TABLET ORAL at 09:03

## 2024-04-06 RX ADMIN — HEPARIN SODIUM 15 UNITS/KG/HR: 10000 INJECTION, SOLUTION INTRAVENOUS at 10:22

## 2024-04-06 RX ADMIN — SODIUM CHLORIDE: 9 INJECTION, SOLUTION INTRAVENOUS at 00:54

## 2024-04-06 RX ADMIN — PHENOBARBITAL 64.8 MG: 32.4 TABLET ORAL at 21:15

## 2024-04-06 RX ADMIN — METOPROLOL TARTRATE 50 MG: 50 TABLET, FILM COATED ORAL at 21:15

## 2024-04-06 RX ADMIN — SODIUM CHLORIDE, PRESERVATIVE FREE 10 ML: 5 INJECTION INTRAVENOUS at 21:16

## 2024-04-06 RX ADMIN — HEPARIN SODIUM 2000 UNITS: 1000 INJECTION INTRAVENOUS; SUBCUTANEOUS at 02:56

## 2024-04-06 RX ADMIN — MIDAZOLAM 2 MG: 1 INJECTION INTRAMUSCULAR; INTRAVENOUS at 12:02

## 2024-04-06 RX ADMIN — METOPROLOL TARTRATE 50 MG: 50 TABLET, FILM COATED ORAL at 09:03

## 2024-04-06 RX ADMIN — APIXABAN 5 MG: 5 TABLET, FILM COATED ORAL at 14:47

## 2024-04-06 RX ADMIN — Medication 1 TABLET: at 09:03

## 2024-04-06 RX ADMIN — APIXABAN 5 MG: 5 TABLET, FILM COATED ORAL at 21:15

## 2024-04-06 ASSESSMENT — PAIN SCALES - GENERAL
PAINLEVEL_OUTOF10: 0

## 2024-04-06 NOTE — PLAN OF CARE
Problem: Discharge Planning  Goal: Discharge to home or other facility with appropriate resources  Outcome: Progressing     Problem: Pain  Goal: Verbalizes/displays adequate comfort level or baseline comfort level  Outcome: Progressing     Problem: Safety - Adult  Goal: Free from fall injury  Outcome: Progressing     Problem: ABCDS Injury Assessment  Goal: Absence of physical injury  Outcome: Progressing     Problem: Chronic Conditions and Co-morbidities  Goal: Patient's chronic conditions and co-morbidity symptoms are monitored and maintained or improved  Outcome: Progressing     Problem: Infection - Adult  Goal: Absence of infection during hospitalization  Outcome: Progressing   Care plan reviewed with patient and verbalize understanding of the plan of care and contribute to goal setting.

## 2024-04-06 NOTE — PLAN OF CARE
Problem: Respiratory - Adult  Goal: Achieves optimal ventilation and oxygenation  Outcome: Progressing  Flowsheets (Taken 4/6/2024 0912)  Achieves optimal ventilation and oxygenation:   Assess for changes in respiratory status   Respiratory therapy support as indicated   Assess for changes in mentation and behavior  Note: Pt had no questions on purpose or side effects of medication   Will continue with maintenance medication

## 2024-04-06 NOTE — PLAN OF CARE
Problem: Respiratory - Adult  Goal: Clear lung sounds  Outcome: Progressing   Pt continues on MDI like he does at home. Patient mutually agreed on goals.

## 2024-04-07 ENCOUNTER — APPOINTMENT (OUTPATIENT)
Age: 59
DRG: 309 | End: 2024-04-07
Payer: COMMERCIAL

## 2024-04-07 VITALS
DIASTOLIC BLOOD PRESSURE: 92 MMHG | HEART RATE: 64 BPM | TEMPERATURE: 97.9 F | OXYGEN SATURATION: 98 % | WEIGHT: 200.18 LBS | RESPIRATION RATE: 16 BRPM | BODY MASS INDEX: 28.02 KG/M2 | HEIGHT: 71 IN | SYSTOLIC BLOOD PRESSURE: 159 MMHG

## 2024-04-07 LAB
25(OH)D3 SERPL-MCNC: 22 NG/ML (ref 30–100)
ANION GAP SERPL CALC-SCNC: 15 MEQ/L (ref 8–16)
BUN SERPL-MCNC: 9 MG/DL (ref 7–22)
CALCIUM SERPL-MCNC: 8.2 MG/DL (ref 8.5–10.5)
CHLORIDE SERPL-SCNC: 98 MEQ/L (ref 98–111)
CO2 SERPL-SCNC: 24 MEQ/L (ref 23–33)
CREAT SERPL-MCNC: 0.5 MG/DL (ref 0.4–1.2)
DEPRECATED RDW RBC AUTO: 51.5 FL (ref 35–45)
ECHO BSA: 2.15 M2
ERYTHROCYTE [DISTWIDTH] IN BLOOD BY AUTOMATED COUNT: 14.7 % (ref 11.5–14.5)
FOLATE SERPL-MCNC: 11.3 NG/ML (ref 4.8–24.2)
GFR SERPL CREATININE-BSD FRML MDRD: > 90 ML/MIN/1.73M2
GLUCOSE BLD STRIP.AUTO-MCNC: 100 MG/DL (ref 70–108)
GLUCOSE BLD STRIP.AUTO-MCNC: 97 MG/DL (ref 70–108)
GLUCOSE SERPL-MCNC: 88 MG/DL (ref 70–108)
HCT VFR BLD AUTO: 40.2 % (ref 42–52)
HGB BLD-MCNC: 13.7 GM/DL (ref 14–18)
MAGNESIUM SERPL-MCNC: 2 MG/DL (ref 1.6–2.4)
MCH RBC QN AUTO: 32.4 PG (ref 26–33)
MCHC RBC AUTO-ENTMCNC: 34.1 GM/DL (ref 32.2–35.5)
MCV RBC AUTO: 95 FL (ref 80–94)
PHOSPHATE SERPL-MCNC: 2.8 MG/DL (ref 2.4–4.7)
PLATELET # BLD AUTO: 102 THOU/MM3 (ref 130–400)
PMV BLD AUTO: 9.8 FL (ref 9.4–12.4)
POTASSIUM SERPL-SCNC: 3.8 MEQ/L (ref 3.5–5.2)
RBC # BLD AUTO: 4.23 MILL/MM3 (ref 4.7–6.1)
SODIUM SERPL-SCNC: 137 MEQ/L (ref 135–145)
VIT B12 SERPL-MCNC: 287 PG/ML (ref 211–911)
WBC # BLD AUTO: 5.7 THOU/MM3 (ref 4.8–10.8)

## 2024-04-07 PROCEDURE — 84100 ASSAY OF PHOSPHORUS: CPT

## 2024-04-07 PROCEDURE — 2580000003 HC RX 258: Performed by: STUDENT IN AN ORGANIZED HEALTH CARE EDUCATION/TRAINING PROGRAM

## 2024-04-07 PROCEDURE — 6370000000 HC RX 637 (ALT 250 FOR IP): Performed by: STUDENT IN AN ORGANIZED HEALTH CARE EDUCATION/TRAINING PROGRAM

## 2024-04-07 PROCEDURE — 83735 ASSAY OF MAGNESIUM: CPT

## 2024-04-07 PROCEDURE — 93270 REMOTE 30 DAY ECG REV/REPORT: CPT

## 2024-04-07 PROCEDURE — 99238 HOSP IP/OBS DSCHRG MGMT 30/<: CPT | Performed by: STUDENT IN AN ORGANIZED HEALTH CARE EDUCATION/TRAINING PROGRAM

## 2024-04-07 PROCEDURE — 82948 REAGENT STRIP/BLOOD GLUCOSE: CPT

## 2024-04-07 PROCEDURE — 6370000000 HC RX 637 (ALT 250 FOR IP): Performed by: NURSE PRACTITIONER

## 2024-04-07 PROCEDURE — 82746 ASSAY OF FOLIC ACID SERUM: CPT

## 2024-04-07 PROCEDURE — 82306 VITAMIN D 25 HYDROXY: CPT

## 2024-04-07 PROCEDURE — 82607 VITAMIN B-12: CPT

## 2024-04-07 PROCEDURE — 80048 BASIC METABOLIC PNL TOTAL CA: CPT

## 2024-04-07 PROCEDURE — 36415 COLL VENOUS BLD VENIPUNCTURE: CPT

## 2024-04-07 PROCEDURE — 85027 COMPLETE CBC AUTOMATED: CPT

## 2024-04-07 RX ORDER — VITAMIN B COMPLEX
1000 TABLET ORAL DAILY
Qty: 60 TABLET | Refills: 0 | Status: SHIPPED | OUTPATIENT
Start: 2024-04-08 | End: 2024-06-07

## 2024-04-07 RX ORDER — MECLIZINE HCL 25MG 25 MG/1
25 TABLET, CHEWABLE ORAL 2 TIMES DAILY
Status: DISCONTINUED | OUTPATIENT
Start: 2024-04-07 | End: 2024-04-07 | Stop reason: HOSPADM

## 2024-04-07 RX ORDER — VITAMIN B COMPLEX
1000 TABLET ORAL DAILY
Status: DISCONTINUED | OUTPATIENT
Start: 2024-04-07 | End: 2024-04-07 | Stop reason: HOSPADM

## 2024-04-07 RX ORDER — NICOTINE 21 MG/24HR
1 PATCH, TRANSDERMAL 24 HOURS TRANSDERMAL DAILY
Qty: 30 PATCH | Refills: 3 | Status: SHIPPED | OUTPATIENT
Start: 2024-04-08

## 2024-04-07 RX ORDER — MECLIZINE HCL 25MG 25 MG/1
25 TABLET, CHEWABLE ORAL 2 TIMES DAILY
Qty: 28 TABLET | Refills: 0 | Status: SHIPPED | OUTPATIENT
Start: 2024-04-07 | End: 2024-04-21

## 2024-04-07 RX ADMIN — Medication 1 TABLET: at 08:46

## 2024-04-07 RX ADMIN — CLOPIDOGREL BISULFATE 75 MG: 75 TABLET ORAL at 08:46

## 2024-04-07 RX ADMIN — APIXABAN 5 MG: 5 TABLET, FILM COATED ORAL at 08:46

## 2024-04-07 RX ADMIN — FOLIC ACID 1 MG: 1 TABLET ORAL at 08:46

## 2024-04-07 RX ADMIN — Medication 100 MG: at 08:46

## 2024-04-07 RX ADMIN — PHENOBARBITAL 32.4 MG: 32.4 TABLET ORAL at 09:20

## 2024-04-07 RX ADMIN — Medication 1000 UNITS: at 13:36

## 2024-04-07 RX ADMIN — METOPROLOL TARTRATE 50 MG: 50 TABLET, FILM COATED ORAL at 08:46

## 2024-04-07 RX ADMIN — SODIUM CHLORIDE, PRESERVATIVE FREE 10 ML: 5 INJECTION INTRAVENOUS at 08:47

## 2024-04-07 NOTE — DISCHARGE SUMMARY
interval change since previous study dated 5/8/2021. 2. Mild atrophy and probable ischemic changes in the white matter. No evidence of an acute infarct. 3. 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 4. There are mild inflammatory changes in ethmoid air cells and mastoid air cells bilaterally. **This report has been created using voice recognition software. It may contain minor errors which are inherent in voice recognition technology.** Final report electronically signed by DR ULI NYE on 4/6/2024 2:10 PM    XR CHEST PORTABLE    Result Date: 4/5/2024  PROCEDURE: XR CHEST PORTABLE CLINICAL INFORMATION: Tachycardia COMPARISON: Chest radiograph 5/6/2021 TECHNIQUE: AP portable chest radiograph performed. FINDINGS: No focal pulmonary consolidation. Cardiac silhouette is not enlarged. No pleural effusion. No pneumothorax. No acute bony abnormality. The bones are demineralized.     1. No acute cardiopulmonary finding. **This report has been created using voice recognition software.  It may contain minor errors which are inherent in voice recognition technology.** Final report electronically signed by Dr Sunita Bhatt on 4/5/2024 10:04 AM    CTA HEAD W WO CONTRAST    Result Date: 4/5/2024  PROCEDURE: CTA HEAD W WO CONTRAST, CTA NECK W WO CONTRAST CLINICAL INFORMATION: Vertigo double vision. COMPARISON: CT scan of the brain obtained on the same day.. TECHNIQUE: 1 mm axial images were obtained through the head and neck after the fast bolus administration of contrast. A noncontrast localizer was obtained. 3-D reconstructions were performed on a dedicated 3-D workstation. These include multiplanar MPR images and multiplanar MIP images.  Centerline reconstructions were obtained of the carotid systems. Isovue intravenous contrast was given. All carotid artery measurements are performed utilizing Nascet  criteria. All CT scans at this facility use dose modulation, iterative reconstruction, and/or weight-based dosing

## 2024-04-07 NOTE — PLAN OF CARE
Neurology following peripherally pending MRI brain.  MRI brain negative for acute infarct.  Symptoms likely related to the underlying atrial fibrillation with rapid ventricular response patient was experiencing at time of admission.  Recommend continued optimization of medical management per primary team and cardiology.  Neurology will sign off at this time.  Thank you for this consult and please reach out with any further questions or concerns.    This was discussed with Dr. Armijo and he is in agreement with the assessment and plan.    Electronically signed by MERLE Small on 4/7/24 at 1:53 PM EDT

## 2024-04-07 NOTE — CARE COORDINATION
Case Management Assessment  Initial Evaluation    Date/Time of Evaluation: 4/7/2024 2:31 PM  Assessment Completed by: Antoinette Barron RN    If patient is discharged prior to next notation, then this note serves as note for discharge by case management.    Patient Name: Carl M Ortolani                   YOB: 1965  Diagnosis: Dizziness [R42]  Atrial fibrillation with RVR ([facility]CC) [I48.91]                   Date / Time: 4/5/2024  7:22 AM  Location: [address] [place] [address]     Patient Admission Status: Inpatient   Readmission Risk Low 0-14, Mod 15-19), High > 20: Readmission Risk Score: 9.1    Current PCP: Carlos Rocha MD  PCP verified by CM? Yes    Chart Reviewed: Yes      History Provided by: Patient  Patient Orientation: Alert and Oriented, Person, Place, Situation, Self    Patient Cognition: Alert    Hospitalization in the last 30 days (Readmission):  No    If yes, Readmission Assessment in CM Navigator will be completed.    Advance Directives:      Code Status: Full Code   Patient's Primary Decision Maker is: Legal Next of Kin      Discharge Planning:    Patient lives with: Alone Type of Home: House  Primary Care Giver: Self  Patient Support Systems include: Children   Current Financial resources: Other (Comment) ([employer])  Current community resources: None  Current services prior to admission: None            Current DME:              Type of Home Care services:  None    ADLS  Prior functional level: Independent in ADLs/IADLs, Bathing, Dressing, Toileting, Feeding, Cooking, Housework, Shopping, Mobility  Current functional level: Independent in ADLs/IADLs, Bathing, Dressing, Toileting, Feeding, Cooking, Housework, Shopping, Mobility    Family can provide assistance at DC: No  Would you like Case Management to discuss the discharge plan with any other family members/significant others, and if so, who? No  Plans to Return to Present Housing: Yes  Other Identified Issues/Barriers to RETURNING to current housing: None,

## 2024-04-07 NOTE — PLAN OF CARE
Problem: Discharge Planning  Goal: Discharge to home or other facility with appropriate resources  Outcome: Progressing  Flowsheets (Taken 4/7/2024 0215)  Discharge to home or other facility with appropriate resources: Identify barriers to discharge with patient and caregiver     Problem: Pain  Goal: Verbalizes/displays adequate comfort level or baseline comfort level  Outcome: Progressing  Flowsheets (Taken 4/7/2024 0215)  Verbalizes/displays adequate comfort level or baseline comfort level:   Encourage patient to monitor pain and request assistance   Assess pain using appropriate pain scale     Problem: Safety - Adult  Goal: Free from fall injury  Outcome: Progressing  Flowsheets (Taken 4/7/2024 0215)  Free From Fall Injury: Instruct family/caregiver on patient safety     Problem: ABCDS Injury Assessment  Goal: Absence of physical injury  Outcome: Progressing  Flowsheets (Taken 4/7/2024 0215)  Absence of Physical Injury: Implement safety measures based on patient assessment     Problem: Chronic Conditions and Co-morbidities  Goal: Patient's chronic conditions and co-morbidity symptoms are monitored and maintained or improved  Outcome: Progressing  Flowsheets (Taken 4/7/2024 0215)  Care Plan - Patient's Chronic Conditions and Co-Morbidity Symptoms are Monitored and Maintained or Improved: Monitor and assess patient's chronic conditions and comorbid symptoms for stability, deterioration, or improvement     Problem: Infection - Adult  Goal: Absence of infection during hospitalization  Outcome: Progressing  Flowsheets (Taken 4/7/2024 0215)  Absence of infection during hospitalization:   Assess and monitor for signs and symptoms of infection   Monitor lab/diagnostic results     Care plan reviewed with patient.  Patient verbalizes understanding of the care plan and contributed to goal setting.

## 2024-04-07 NOTE — PLAN OF CARE
Problem: Discharge Planning  Goal: Discharge to home or other facility with appropriate resources  4/7/2024 1202 by Christal Green RN  Outcome: Progressing  Flowsheets (Taken 4/7/2024 1202)  Discharge to home or other facility with appropriate resources:   Identify barriers to discharge with patient and caregiver   Arrange for needed discharge resources and transportation as appropriate     Problem: Pain  Goal: Verbalizes/displays adequate comfort level or baseline comfort level  4/7/2024 1202 by Christal Green RN  Outcome: Progressing  Flowsheets (Taken 4/7/2024 1202)  Verbalizes/displays adequate comfort level or baseline comfort level:   Encourage patient to monitor pain and request assistance   Assess pain using appropriate pain scale   Administer analgesics based on type and severity of pain and evaluate response   Implement non-pharmacological measures as appropriate and evaluate response     Problem: Safety - Adult  Goal: Free from fall injury  4/7/2024 1202 by Christal Green RN  Outcome: Progressing  Flowsheets (Taken 4/7/2024 1202)  Free From Fall Injury: Instruct family/caregiver on patient safety     Problem: ABCDS Injury Assessment  Goal: Absence of physical injury  4/7/2024 1202 by Christal Green RN  Outcome: Progressing  Flowsheets (Taken 4/7/2024 1202)  Absence of Physical Injury: Implement safety measures based on patient assessment     Problem: Cardiovascular - Adult  Goal: Maintains optimal cardiac output and hemodynamic stability  Outcome: Progressing  Flowsheets (Taken 4/7/2024 1202)  Maintains optimal cardiac output and hemodynamic stability:   Monitor blood pressure and heart rate   Assess for signs of decreased cardiac output     Problem: Cardiovascular - Adult  Goal: Absence of cardiac dysrhythmias or at baseline  Outcome: Progressing  Flowsheets (Taken 4/7/2024 1202)  Absence of cardiac dysrhythmias or at baseline:   Monitor cardiac rate and rhythm   Assess for signs of

## 2024-04-07 NOTE — PROGRESS NOTES
Hospitalist Progress Note      Patient:  Carl M Ortolani    Unit/Bed:3B-38/038-A  YOB: 1965  MRN: [id number]   Acct: [id number]   PCP: Carlos Rocha MD  Date of Admission: 4/5/2024    Chief Complaint: diplopia/dizziness    Admission HPI:   \"Carl M. Ortolani is a 58-year-old gentleman with history of chronic alcohol abuse, sleep apnea (nonadherent to CPAP), daily smoker (1 ppd), who presents to [facility] ED on 04/05/2024 with chief complaint of vertigo. Patient states that yesterday while he was sitting on the couch watching TV, he developed a sudden onset vertigo, diplopia, became ataxic while drinking alcohol. He struggled with walking. Was unable to go to work today, and employer called EMS for him today. On arrival, patient was tachycardic but in normal sinus rhythm ~100s, very tremulous, CT head was unremarkable. However CTA head and neck revealed diffuse atherosclerotic plaque disease and calcifications. MRI was recommended for further evaluation. He did not tolerate MRI due to anxiety and claustrophobia despite premedication with 1 mg Ativan by ED. While down in MRI, he flipped into A-fib RVR presumably for his first time ever. Rate 160s. He was started on Cardizem and heparin gtt. he is not a tPA candidate. We were requested to admit for further evaluation, workup, for vertigo and A-fib RVR. Lab workup revealed high anion gap metabolic acidosis with a gap of 26, lactic acidosis 2.8. Beta hydroxybutyrate was elevated at 32.99. He was started on fluids and phenobarbital protocol, thiamine. Cardiology and neurology were both consulted and he was subsequently admitted for further management.\"    Subjective (past 24 hours):   No acute events overnight. Reports continued vertigo. Reports he has had it in the past. No n/v. Chronic tinitus with no acute worsening. Denies headache. No visual symptoms this am.     Assessment/Plan:    Peripheral vs Central 
Discussed discharge summary with patient. Instructed patient about medications & follow up appointments with PCP, cardiology, outpatient stress test and vestibular therapy. Information about A Fib given to patient. Patient not very receptive to discharge education, patient denies any additional questions. Patient was discharged with all belongings and with cardiac event monitor in place No distress noted. Patient discharged to home. Taken down to the vehicle per wheelchair.     
MRI of brain not completed, pt refused even after being medicated.  Pt states he feels like he can't breathe,  
Utilize [facility] alcohol withdrawal scale (Based on Arash Modified Alcohol Withdrawal Scale).  Tabulate score based on classifications including Tremor, Sweating, Hallucination, Orientation, and Agitation.    Tremor: 2  Sweatin  Hallucinations: 0  Orientation: 0  Agitation: 1  Total Score: 3  Action perform as described below     Tremor:  No tremor is 0 points.  Tremor on movement is 1 point.  Tremor at rest is 2 points.  Sweating: No Sweat 0 points. Moist is 1 point.  Drenching sweats is 2 points.  Hallucinations: No present 0 points. Dissuadable is 1 point. Not dissuadable is 2 points.  Orientation: Oriented 0 points. Vague/detached 1 point. Disoriented/no contact 2 points.  Agitation: Calm 0 points.  Anxious 1 point. Panicky 2 points.    Check scale every 2 hours.  Discontinue scoring with 4 consecutive scorings of 0.  Scale 0: No phenobarbital given.  Re-assess every 30 minutes as needed.   Scale 1-3: Phenobarbital 130 mg IV over 3 minutes. Re-assess every 30 minutes as needed.  May administer every 30 minutes to a maximum dose of phenobarbital 1040 mg in 24 hours!  Scale 4-8: Phenobarbital 260 mg IV over 5 minutes.  Re-assess every 30 minutes as needed. May administer every 30 minutes to a maximum dose of phenobarbital 1040mg in 24 hours!  Scale 9-10: Transfer to ICU (if not already in ICU).  Administer 10mg/kg phenobarbital IV over 30 minutes.  Maximum dose phenobarbital is 1040mg in 24 hours!   
chewing tobacco, electronic cigarettes, marijuana products, recreational drugs.  He admits to having antibiotics prescribed to him on 04/03/2024 secondary to his symptoms.  He denies any sick contacts, recent travel, occupational exposures to chemicals.  He states that he lives at home by himself and denies the use of daily inhalers, supplemental oxygen, CPAP/BiPAP, walker/cane.       Subjective (Events in last 24 hours):   Pt in bed   No chest pain or SOB   No dizziness this am   Denies palpitations when in [employer]     Tele SR no ectopy   VSS      Objective:   /79   Pulse 57   Temp 98.2 °F (36.8 °C) (Oral)   Resp 18   Ht 1.803 m (5' 11\")   Wt 91.8 kg (202 lb 6.1 oz)   SpO2 93%   BMI 28.23 kg/m²        TELEMETRY: SR no ectopy     Physical Exam:  General Appearance: alert and oriented to person, place and time, in no acute distress  Cardiovascular: normal rate, regular rhythm, normal S1 and S2, no murmurs, rubs, clicks, or gallops, distal pulses intact,   Pulmonary/Chest: clear to auscultation bilaterally- no wheezes, rales or rhonchi, normal air movement, no respiratory distress  Abdomen: soft, non-tender, non-distended, normal bowel sounds, no masses   Extremities: no cyanosis, clubbing or edema, pulses present    Skin: warm and dry, no rash or erythema   Musculoskeletal: normal range of motion, no joint swelling, deformity or tenderness  Neurological: alert, oriented, normal speech, no focal findings or movement disorder noted    Medications:    mometasone-formoterol  2 puff Inhalation BID RT    folic acid  1 mg Oral Daily    metoprolol tartrate  50 mg Oral BID    multivitamin  1 tablet Oral Daily    thiamine  100 mg Oral Daily    atorvastatin  40 mg Oral Nightly    sodium chloride flush  5-40 mL IntraVENous 2 times per day    clopidogrel  75 mg Oral Daily    insulin lispro  0-16 Units SubCUTAneous TID WC    insulin lispro  0-4 Units SubCUTAneous Nightly    PHENobarbital  64.8 mg Oral BID    Followed by

## 2024-04-08 LAB
EKG ATRIAL RATE: 106 BPM
EKG ATRIAL RATE: 293 BPM
EKG ATRIAL RATE: 61 BPM
EKG P AXIS: 71 DEGREES
EKG P AXIS: 76 DEGREES
EKG P-R INTERVAL: 148 MS
EKG P-R INTERVAL: 170 MS
EKG Q-T INTERVAL: 292 MS
EKG Q-T INTERVAL: 334 MS
EKG Q-T INTERVAL: 376 MS
EKG Q-T INTERVAL: 448 MS
EKG QRS DURATION: 80 MS
EKG QRS DURATION: 86 MS
EKG QRS DURATION: 86 MS
EKG QRS DURATION: 92 MS
EKG QTC CALCULATION (BAZETT): 435 MS
EKG QTC CALCULATION (BAZETT): 450 MS
EKG QTC CALCULATION (BAZETT): 470 MS
EKG QTC CALCULATION (BAZETT): 499 MS
EKG R AXIS: 69 DEGREES
EKG R AXIS: 72 DEGREES
EKG R AXIS: 73 DEGREES
EKG R AXIS: 74 DEGREES
EKG T AXIS: 51 DEGREES
EKG T AXIS: 59 DEGREES
EKG T AXIS: 73 DEGREES
EKG T AXIS: 92 DEGREES
EKG VENTRICULAR RATE: 102 BPM
EKG VENTRICULAR RATE: 106 BPM
EKG VENTRICULAR RATE: 156 BPM
EKG VENTRICULAR RATE: 61 BPM

## 2024-04-08 NOTE — ADT AUTH CERT
Comments  CommentPatient Demographics    Name Patient ID SSN Gender Identity Birth Date   Ortolani, Carl M [id number]  Male [birth year] (58 yrs)     Address Phone Email Employer   [address] [id number] [phone] (M)  [phone] (H) [email or web address]  [address] [place] [id number] [phone]     [facility] Race Occupation Emp Status    BHARGAV White (non-) -- Full Time      Reg Status PCP Date Last Verified Next Review Date    Verified Carlos Rocha MD  [phone] 24      Admission Date Discharge Date Admitting Provider     24 --       Marital Status Buddhism       Other        Emergency Contact 1   Christian Ortolani (C)  [place]  [phone] (H)  [phone] (M)     Subscriber Details  Hospital Account #[id number]  [employer] Subscriber Name/Sex/Relation Subscriber  Subscriber Address/Phone Subscriber Emp/Emp Phone   1. [employer]  [id number] ORTOLANI,CARL M - Male  (Self) 1965 [address] [place], [place] [id number] [phone](H) [employer]  [phone]     Utilization Reviews           Last Updated by Marla Cruz RN on 2024 1235     Review Status Created By   In Primary Marla Cruz RN       Review Type Associated Date   -- 2024      Criteria Review   DATE:    TYPE OF BED: Telemetry     RELEVANT BASELINES: (lab values, vitals, o2 amount/delivery, etc.)  RA. IPTA         PERTINENT UPDATES:  IM and Cardiology following   Cont BB and dc cardiazem gtt. Dc heparin and start po eliquis   MRI Brain pending   Echo pending        VITALS: 98.4 (36.9)    18        70        156/76  94% RA         ABNL/PERTINENT LABS/RADIOLOGY/DIAGNOSTIC STUDIES:  24 00:23  Heparin Unfractionated: 0.28 (L)     24 07:01  CO2: 21 (L)  Glucose, Random: 92  CALCIUM, SERUM, [id number]: 8.0 (L)  RBC: 4.36 (L)  MCV: 96.6 (H)  MPV: 9.3 (L)  RDW-CV: 15.6 (H)  RDW-SD: 54.6 (H)  Platelet Count: 105

## 2024-04-10 LAB
BACTERIA BLD AEROBE CULT: NORMAL
BACTERIA BLD AEROBE CULT: NORMAL

## 2024-05-10 LAB — ECHO BSA: 2.15 M2
